# Patient Record
Sex: FEMALE | Race: WHITE | Employment: OTHER | URBAN - METROPOLITAN AREA
[De-identification: names, ages, dates, MRNs, and addresses within clinical notes are randomized per-mention and may not be internally consistent; named-entity substitution may affect disease eponyms.]

---

## 2023-07-25 ENCOUNTER — TELEPHONE (OUTPATIENT)
Dept: ORTHOPEDIC SURGERY | Age: 63
End: 2023-07-25

## 2023-07-25 NOTE — TELEPHONE ENCOUNTER
Patient would like refill of percocet. Please send to:    0 [facility] [address] [facility] [address], [address], [address] [place] [id number]   Phone:  [phone]  Fax:  [phone]    Attached Sharri Amaya PA-C as Dr Kevan Soto is out of office.

## 2023-08-02 ENCOUNTER — TELEPHONE (OUTPATIENT)
Dept: ORTHOPEDIC SURGERY | Age: 63
End: 2023-08-02

## 2023-08-02 NOTE — TELEPHONE ENCOUNTER
REFUGIO    Patient called concerned about drainage from surgical incision again. Patient denies feverish, painful incision. Patient describes scant amount of clear pinkish drainage from bottom of incision. Patient denies fever, chills, body aches, chest pain and shortness of breath. Patient has appointment with Pastor Nara CHOWDHURY on 8/4/2023. Patient is comfortable waiting to see PA in a few days for this matter. However, patient was educated what to watch for with incision and body feelings and when to call the office or Ortho Navigator, patient verbalized understanding of education.

## 2023-08-30 PROBLEM — Z79.2 RECEIVING INTRAVENOUS ANTIBIOTIC TREATMENT AS OUTPATIENT: Status: ACTIVE | Noted: 2023-08-30

## 2023-08-30 PROBLEM — A49.01 METHICILLIN SUSCEPTIBLE STAPHYLOCOCCUS AUREUS INFECTION: Status: ACTIVE | Noted: 2023-08-30

## 2023-09-18 ENCOUNTER — HOSPITAL ENCOUNTER (OUTPATIENT)
Age: 63
Setting detail: SPECIMEN
Discharge: HOME | End: 2023-09-18
Payer: COMMERCIAL

## 2023-09-18 ENCOUNTER — TELEPHONE (OUTPATIENT)
Dept: INFECTIOUS DISEASES | Age: 63
End: 2023-09-18

## 2023-09-18 LAB
ALBUMIN SERPL-MCNC: 3.7 GM/DL (ref 3.4–5)
ALP BLD-CCNC: 181 IU/L (ref 40–129)
ALT SERPL-CCNC: 6 U/L (ref 10–40)
ANION GAP SERPL CALCULATED.3IONS-SCNC: 11 MMOL/L (ref 4–16)
AST SERPL-CCNC: 20 IU/L (ref 15–37)
BASOPHILS ABSOLUTE: 0 K/CU MM
BASOPHILS RELATIVE PERCENT: 0.5 % (ref 0–1)
BILIRUB SERPL-MCNC: 0.2 MG/DL (ref 0–1)
BUN SERPL-MCNC: 11 MG/DL (ref 6–23)
CALCIUM SERPL-MCNC: 9.2 MG/DL (ref 8.3–10.6)
CHLORIDE BLD-SCNC: 100 MMOL/L (ref 99–110)
CO2: 26 MMOL/L (ref 21–32)
CREAT SERPL-MCNC: 0.6 MG/DL (ref 0.6–1.1)
CRP SERPL HS-MCNC: 32.2 MG/L
DIFFERENTIAL TYPE: ABNORMAL
EOSINOPHILS ABSOLUTE: 0.3 K/CU MM
EOSINOPHILS RELATIVE PERCENT: 4.2 % (ref 0–3)
ERYTHROCYTE SEDIMENTATION RATE: 57 MM/HR (ref 0–30)
GFR SERPL CREATININE-BSD FRML MDRD: >60 ML/MIN/1.73M2
GLUCOSE P FAST SERPL-MCNC: 93 MG/DL (ref 70–99)
HCT VFR BLD CALC: 36.5 % (ref 37–47)
HEMOGLOBIN: 11.2 GM/DL (ref 12.5–16)
IMMATURE NEUTROPHIL %: 0.2 % (ref 0–0.43)
LYMPHOCYTES ABSOLUTE: 1.4 K/CU MM
LYMPHOCYTES RELATIVE PERCENT: 22.5 % (ref 24–44)
MCH RBC QN AUTO: 24.5 PG (ref 27–31)
MCHC RBC AUTO-ENTMCNC: 30.7 % (ref 32–36)
MCV RBC AUTO: 79.7 FL (ref 78–100)
MONOCYTES ABSOLUTE: 0.6 K/CU MM
MONOCYTES RELATIVE PERCENT: 10.4 % (ref 0–4)
PDW BLD-RTO: 14.4 % (ref 11.7–14.9)
PLATELET # BLD: 335 K/CU MM (ref 140–440)
PMV BLD AUTO: 10.6 FL (ref 7.5–11.1)
POTASSIUM SERPL-SCNC: 4.3 MMOL/L (ref 3.5–5.1)
RBC # BLD: 4.58 M/CU MM (ref 4.2–5.4)
SEGMENTED NEUTROPHILS ABSOLUTE COUNT: 3.9 K/CU MM
SEGMENTED NEUTROPHILS RELATIVE PERCENT: 62.2 % (ref 36–66)
SODIUM BLD-SCNC: 137 MMOL/L (ref 135–145)
TOTAL IMMATURE NEUTOROPHIL: 0.01 K/CU MM
TOTAL PROTEIN: 7.3 GM/DL (ref 6.4–8.2)
WBC # BLD: 6.2 K/CU MM (ref 4–10.5)

## 2023-09-18 PROCEDURE — 86140 C-REACTIVE PROTEIN: CPT

## 2023-09-18 PROCEDURE — 80053 COMPREHEN METABOLIC PANEL: CPT

## 2023-09-18 PROCEDURE — 85025 COMPLETE CBC W/AUTO DIFF WBC: CPT

## 2023-09-18 PROCEDURE — 85652 RBC SED RATE AUTOMATED: CPT

## 2023-09-18 NOTE — TELEPHONE ENCOUNTER
Shivam Morales, from [facility] called and states pt was done on 9-17-23  with ABX. Can PICC be removed? Please advise.

## 2023-09-22 ENCOUNTER — TELEPHONE (OUTPATIENT)
Dept: INFECTIOUS DISEASES | Age: 63
End: 2023-09-22

## 2023-09-25 ENCOUNTER — TELEPHONE (OUTPATIENT)
Dept: INFECTIOUS DISEASES | Age: 63
End: 2023-09-25

## 2023-09-25 NOTE — TELEPHONE ENCOUNTER
This message was posted from [place]:  I started the Bactrim on Monday Sept 18 as instructed. Tuesday I woke up feeling lightheaded, weak and no energy. I laid down a lot of the day. Wednesday I felt the same, but worse. I slept most of the day and night. I also had no appetite. Thursday again the same symptoms. I again slept all day an night. I did not take the Bactrim at all on Thursday. Friday I wasn't as tired, but still very weak, lightheaded and no appetite. By 5:30pm I had a red rash on my arms. That rash did not itch. Then I started itching on various parts of my body. I have taken Benadryl and the itching and rash will stop for about an hour, then starts all over again. This has been nonstop since Friday. I don't know what to do. Please advise. normal...

## 2023-09-26 ENCOUNTER — OFFICE VISIT (OUTPATIENT)
Dept: ORTHOPEDIC SURGERY | Age: 63
End: 2023-09-26

## 2023-09-26 VITALS
RESPIRATION RATE: 16 BRPM | WEIGHT: 276 LBS | HEIGHT: 69 IN | TEMPERATURE: 97.2 F | OXYGEN SATURATION: 99 % | BODY MASS INDEX: 40.88 KG/M2 | HEART RATE: 78 BPM

## 2023-09-26 DIAGNOSIS — Z96.651 S/P TOTAL KNEE REPLACEMENT, RIGHT: Primary | ICD-10-CM

## 2023-09-26 PROCEDURE — 99024 POSTOP FOLLOW-UP VISIT: CPT

## 2023-09-26 ASSESSMENT — ENCOUNTER SYMPTOMS
SHORTNESS OF BREATH: 0
RHINORRHEA: 0
FACIAL SWELLING: 0
NAUSEA: 0
COUGH: 0
BACK PAIN: 0

## 2023-09-26 NOTE — PATIENT INSTRUCTIONS
Work on your gait and strengthening of the lower body  Continue to weight bear as tolerated  Continue range of motion  Ice and elevate as needed  Tylenol or Motrin for pain  Follow up in 4 weeks with Dr. Matilde Carrillo to discuss Left TKA     We are committed to providing you the best care possible. If you receive a survey after visiting one of our offices, please take time to share your experience concerning your physician office visit. These surveys are confidential and no health information about you is shared. We are eager to improve for you and we are counting on your feedback to help make that happen.

## 2023-09-27 ENCOUNTER — OFFICE VISIT (OUTPATIENT)
Dept: INFECTIOUS DISEASES | Age: 63
End: 2023-09-27
Payer: COMMERCIAL

## 2023-09-27 VITALS
SYSTOLIC BLOOD PRESSURE: 112 MMHG | OXYGEN SATURATION: 96 % | DIASTOLIC BLOOD PRESSURE: 65 MMHG | HEART RATE: 111 BPM | WEIGHT: 272 LBS | BODY MASS INDEX: 40.17 KG/M2

## 2023-09-27 DIAGNOSIS — T84.53XD INFECTION OF TOTAL RIGHT KNEE REPLACEMENT, SUBSEQUENT ENCOUNTER: Primary | ICD-10-CM

## 2023-09-27 DIAGNOSIS — T50.905A DRUG-INDUCED PRURITUS: ICD-10-CM

## 2023-09-27 DIAGNOSIS — L29.8 DRUG-INDUCED PRURITUS: ICD-10-CM

## 2023-09-27 PROBLEM — L29.89 DRUG-INDUCED PRURITUS: Status: ACTIVE | Noted: 2023-09-27

## 2023-09-27 PROCEDURE — 99214 OFFICE O/P EST MOD 30 MIN: CPT | Performed by: INTERNAL MEDICINE

## 2023-09-27 RX ORDER — DOXYCYCLINE HYCLATE 100 MG
100 TABLET ORAL 2 TIMES DAILY
Qty: 60 TABLET | Refills: 1 | Status: SHIPPED | OUTPATIENT
Start: 2023-09-27 | End: 2023-11-26

## 2023-09-27 RX ORDER — CHOLECALCIFEROL (VITAMIN D3) 125 MCG
500 CAPSULE ORAL DAILY
COMMUNITY

## 2023-09-27 RX ORDER — PREDNISONE 20 MG/1
30 TABLET ORAL DAILY
Qty: 11 TABLET | Refills: 0 | Status: SHIPPED | OUTPATIENT
Start: 2023-09-27 | End: 2023-10-04

## 2023-09-27 RX ORDER — ACETAMINOPHEN 160 MG
TABLET,DISINTEGRATING ORAL
COMMUNITY

## 2023-09-27 NOTE — PROGRESS NOTES
reactive, extra ocular muscles intact, oropharynx clear, mucus membranes moist, tympanic membranes clear bilaterally, no cervical lymphadenopathy noted, and neck supple  Neck: supple, no significant adenopathy  Chest: clear to auscultation, no wheezes, rales or rhonchi, symmetric air entry  Heart: regular rate and rhythm, no murmurs  ABD: abdomen is soft without significant tenderness, masses, organomegaly or guarding.   EXT:peripheral pulses normal, no pedal edema, no clubbing or cyanosis  NEURO: alert, oriented, normal speech, no focal findings or movement disorder noted  Skin: well hydrated, no lesions, surgical site examined  Wounds: none  Labs:   WBC   Date Value Ref Range Status   09/18/2023 6.2 4.0 - 10.5 K/CU MM Final   09/11/2023 8.1 4.0 - 10.5 K/CU MM Final   09/05/2023 6.6 4.0 - 10.5 K/CU MM Final     Creatinine   Date Value Ref Range Status   09/18/2023 0.6 0.6 - 1.1 MG/DL Final   09/11/2023 0.6 0.6 - 1.1 MG/DL Final   09/05/2023 0.7 0.6 - 1.1 MG/DL Final       Cultures:  Culture   Date Value Ref Range Status   08/07/2023 NO GROWTH AT 5 DAYS  Final   08/07/2023 NO GROWTH AT 5 DAYS  Final   08/04/2023 Final Report  Final   08/04/2023 STAPHYLOCOCCUS AUREUS Light growth PBP2= Negative (A)  Final   08/04/2023 Final Report  Final   08/04/2023 (A)  Final    STAPHYLOCOCCUS AUREUS Light growth No further workup Refer to culture [id number] ( 8/4/23) for sensitivity results       Imaging Studies:

## 2023-09-28 NOTE — ASSESSMENT & PLAN NOTE
Bactrim induced.  Continues to itch despite diphenhydramine use. will use short course of corticosteroids

## 2023-09-29 LAB
A/G RATIO: 1.1
ALBUMIN SERPL-MCNC: 3.9 G/DL
ALP BLD-CCNC: 362 U/L
ALT SERPL-CCNC: 25 U/L
AST SERPL-CCNC: 26 U/L
BILIRUB SERPL-MCNC: 0.6 MG/DL (ref 0.1–1.4)
BILIRUBIN DIRECT: 0.3 MG/DL
BILIRUBIN, INDIRECT: 0.3
BUN BLDV-MCNC: 21 MG/DL
C-REACTIVE PROTEIN: 0.99
CALCIUM SERPL-MCNC: 9.9 MG/DL
CHLORIDE BLD-SCNC: 101 MMOL/L
CO2: 30 MMOL/L
CREAT SERPL-MCNC: 0.7 MG/DL
EGFR: 98
GLOBULIN: 3.7
GLUCOSE BLD-MCNC: 109 MG/DL
POTASSIUM SERPL-SCNC: 5.1 MMOL/L
PROTEIN TOTAL: 7.6 G/DL
SEDIMENTATION RATE, ERYTHROCYTE: 52
SODIUM BLD-SCNC: 140 MMOL/L

## 2023-09-30 LAB
BASOPHILS ABSOLUTE: ABNORMAL
BASOPHILS RELATIVE PERCENT: ABNORMAL
EOSINOPHILS ABSOLUTE: ABNORMAL
EOSINOPHILS RELATIVE PERCENT: ABNORMAL
HCT VFR BLD CALC: 36.7 % (ref 36–46)
HEMOGLOBIN: 11.7 G/DL (ref 12–16)
LYMPHOCYTES ABSOLUTE: ABNORMAL
LYMPHOCYTES RELATIVE PERCENT: ABNORMAL
MCH RBC QN AUTO: 24.4 PG
MCHC RBC AUTO-ENTMCNC: 31.9 G/DL
MCV RBC AUTO: 76.6 FL
MONOCYTES ABSOLUTE: ABNORMAL
MONOCYTES RELATIVE PERCENT: ABNORMAL
NEUTROPHILS ABSOLUTE: ABNORMAL
NEUTROPHILS RELATIVE PERCENT: ABNORMAL
PLATELET # BLD: ABNORMAL 10*3/UL
PMV BLD AUTO: ABNORMAL FL
RBC # BLD: 4.79 10^6/ΜL
WBC # BLD: 9.9 10^3/ML

## 2023-10-02 DIAGNOSIS — T84.53XD INFECTION OF TOTAL RIGHT KNEE REPLACEMENT, SUBSEQUENT ENCOUNTER: ICD-10-CM

## 2023-10-04 ENCOUNTER — OFFICE VISIT (OUTPATIENT)
Dept: INFECTIOUS DISEASES | Age: 63
End: 2023-10-04
Payer: COMMERCIAL

## 2023-10-04 VITALS
DIASTOLIC BLOOD PRESSURE: 59 MMHG | SYSTOLIC BLOOD PRESSURE: 137 MMHG | HEART RATE: 97 BPM | BODY MASS INDEX: 39.93 KG/M2 | HEIGHT: 69 IN | WEIGHT: 269.62 LBS | OXYGEN SATURATION: 96 %

## 2023-10-04 DIAGNOSIS — T50.905A DRUG-INDUCED PRURITUS: Primary | ICD-10-CM

## 2023-10-04 DIAGNOSIS — T84.53XD INFECTION OF TOTAL RIGHT KNEE REPLACEMENT, SUBSEQUENT ENCOUNTER: ICD-10-CM

## 2023-10-04 DIAGNOSIS — L29.8 DRUG-INDUCED PRURITUS: Primary | ICD-10-CM

## 2023-10-04 PROCEDURE — 99214 OFFICE O/P EST MOD 30 MIN: CPT | Performed by: INTERNAL MEDICINE

## 2023-10-04 RX ORDER — PREDNISONE 5 MG/1
TABLET ORAL
Qty: 102 TABLET | Refills: 0 | Status: SHIPPED | OUTPATIENT
Start: 2023-10-04

## 2023-10-04 RX ORDER — CEFADROXIL 500 MG/1
500 CAPSULE ORAL 2 TIMES DAILY
Qty: 28 CAPSULE | Refills: 0 | Status: SHIPPED | OUTPATIENT
Start: 2023-10-04 | End: 2023-10-18

## 2023-10-04 NOTE — ASSESSMENT & PLAN NOTE
CRP on a downward trend. Stop doxycycline for a week. Prescribe cefadroxil for 14 days, to see if she tolerates it.

## 2023-10-04 NOTE — PROGRESS NOTES
10/4/2023         Referring Physician: No ref. provider found  Primary Care Physician: Darrel Fuentes MD    Impression/Plan:   Diagnosis Orders   1. Drug-induced pruritus  predniSONE (DELTASONE) 5 MG tablet      2. Infection of total right knee replacement, subsequent encounter  cefadroxil (DURICEF) 500 MG capsule          Discussion:  Infection of total right knee replacement (HCC)  CRP on a downward trend. Stop doxycycline for a week. Prescribe cefadroxil for 14 days, to see if she tolerates it. Drug-induced pruritus  Itching persists. Possible idiosyncratic reaction to doxycycline. Especially since only her arms and neck itch. Antibiotic holiday for one week. Increase dose of corticosteroids to 60 mg daily, then taper. Return in about 1 week (around 10/11/2023). 35 minutes was spent in the encounter; more than 50% of the face-to-face time was spent with the patient providing counseling and coordination of care. History: Henny Live is a 58 y.o.  female presenting today for follow-up. She was seen in the hospital during 8/4/2023 admission for pain, swelling, and redness of her right knee joint status post right total knee arthroplasty on 7/10/2023. 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 I&D and poly exchange was done on 8/4/2023. Right knee culture and blood cultures were positive for MSSA. She was discharged on 8/10/2023 to complete a 6-week course of intravenous cefazolin and oral rifampicin with a tentative end date of 9/17/2023. And the plan to transition to oral antibiotics for at least 6 months. She is here with her : Kalyn Silvestre. Denies fatigue, fever, chills, night sweats and weight loss. Right knee incision has healed. 9/27/2023: Last visit Bactrim was prescribed. However, she developed itching to it. She has stopped the Bactrim. A day after starting Bactrim she felt light-headed and weak. Itching started on the 5th day of Bactrim.  She stopped Bactrim

## 2023-10-04 NOTE — ASSESSMENT & PLAN NOTE
Itching persists. Possible idiosyncratic reaction to doxycycline. Especially since only her arms and neck itch. Antibiotic holiday for one week. Increase dose of corticosteroids to 60 mg daily, then taper.

## 2023-10-20 LAB
A/G RATIO: 1.2
ALBUMIN SERPL-MCNC: 3.6 G/DL
ALP BLD-CCNC: 142 U/L
ALT SERPL-CCNC: 13 U/L
AST SERPL-CCNC: 15 U/L
BASOPHILS ABSOLUTE: ABNORMAL
BASOPHILS RELATIVE PERCENT: ABNORMAL
BILIRUB SERPL-MCNC: 0.2 MG/DL (ref 0.1–1.4)
BILIRUBIN DIRECT: 0.2 MG/DL
BILIRUBIN, INDIRECT: NORMAL
C-REACTIVE PROTEIN: 1.44
EOSINOPHILS ABSOLUTE: ABNORMAL
EOSINOPHILS RELATIVE PERCENT: ABNORMAL
GLOBULIN: 3.1
HCT VFR BLD CALC: 40.2 % (ref 36–46)
HEMOGLOBIN: 12.4 G/DL (ref 12–16)
LYMPHOCYTES ABSOLUTE: ABNORMAL
LYMPHOCYTES RELATIVE PERCENT: ABNORMAL
MCH RBC QN AUTO: 23.7 PG
MCHC RBC AUTO-ENTMCNC: 30.8 G/DL
MCV RBC AUTO: 76.9 FL
MONOCYTES ABSOLUTE: ABNORMAL
MONOCYTES RELATIVE PERCENT: ABNORMAL
NEUTROPHILS ABSOLUTE: ABNORMAL
NEUTROPHILS RELATIVE PERCENT: ABNORMAL
PLATELET # BLD: 347 K/ΜL
PMV BLD AUTO: 10.4 FL
PROTEIN TOTAL: 6.7 G/DL
RBC # BLD: 5.23 10^6/ΜL
SEDIMENTATION RATE, ERYTHROCYTE: 29
WBC # BLD: 11.7 10^3/ML

## 2023-10-24 ENCOUNTER — OFFICE VISIT (OUTPATIENT)
Dept: ORTHOPEDIC SURGERY | Age: 63
End: 2023-10-24

## 2023-10-24 VITALS
RESPIRATION RATE: 15 BRPM | WEIGHT: 265 LBS | HEART RATE: 75 BPM | OXYGEN SATURATION: 98 % | BODY MASS INDEX: 39.25 KG/M2 | HEIGHT: 69 IN

## 2023-10-24 DIAGNOSIS — M17.12 PRIMARY OSTEOARTHRITIS OF LEFT KNEE: ICD-10-CM

## 2023-10-24 DIAGNOSIS — Z96.651 S/P TOTAL KNEE REPLACEMENT, RIGHT: Primary | ICD-10-CM

## 2023-10-24 RX ORDER — SERTRALINE HYDROCHLORIDE 100 MG/1
100 TABLET, FILM COATED ORAL 2 TIMES DAILY
COMMUNITY
Start: 2023-10-04

## 2023-10-24 NOTE — PATIENT INSTRUCTIONS
We will schedule surgery at soonest convenience.  If you have any questions regarding your surgery please call our office and ask to speak with Gerda Mishra [phone]

## 2023-10-24 NOTE — PROGRESS NOTES
Patient returns to the office with left knee pain. Pt stated that she is seeing the same symptoms with the left knee that she did the right knee. Pt stated that she feels intermittent pain in this knee and instability. Pt stated her right knee did really well with the replacement and would benefit from doing the left.

## 2023-10-25 DIAGNOSIS — T84.53XD INFECTION OF TOTAL RIGHT KNEE REPLACEMENT, SUBSEQUENT ENCOUNTER: ICD-10-CM

## 2023-10-25 RX ORDER — CEFADROXIL 500 MG/1
500 CAPSULE ORAL 2 TIMES DAILY
Qty: 28 CAPSULE | Refills: 0 | OUTPATIENT
Start: 2023-10-25 | End: 2023-11-08

## 2023-10-25 NOTE — TELEPHONE ENCOUNTER
Pt needs a refill on her cefadroxil 500 mgs. Graft Cartilage Fenestration Text: The cartilage was fenestrated with a 2mm punch biopsy to help facilitate graft survival and healing.

## 2023-10-26 DIAGNOSIS — G89.29 CHRONIC PAIN OF LEFT KNEE: ICD-10-CM

## 2023-10-26 DIAGNOSIS — M25.562 CHRONIC PAIN OF LEFT KNEE: ICD-10-CM

## 2023-10-26 DIAGNOSIS — M17.12 PRIMARY OSTEOARTHRITIS OF LEFT KNEE: Primary | ICD-10-CM

## 2023-10-29 ASSESSMENT — ENCOUNTER SYMPTOMS
CHEST TIGHTNESS: 0
EYE PAIN: 0
VOMITING: 0
WHEEZING: 0
EYE REDNESS: 0
SHORTNESS OF BREATH: 0
COLOR CHANGE: 0

## 2023-10-31 ENCOUNTER — TELEPHONE (OUTPATIENT)
Dept: ORTHOPEDIC SURGERY | Age: 63
End: 2023-10-31

## 2023-10-31 NOTE — TELEPHONE ENCOUNTER
Scheduled patient for:    Left Total Knee Arthroplasty  CPT: 41777  ICD 10: M17.12; M25.562  Surgery Date: 12/13/2023  Surgeon: Sherif Finders: [facility]  Product: Yue Juan  Anesthesia: General/Nerve Block    Physical Therapy: [facility]-order created  CT of Lt Knee for robot Protocol-order created    Clearances obtained from:  PCP: Elodia Ott  Cardiac: Ahmed  --from previous surgery in June 2023    Ins[place]  Prior auth started on 10/31/2023 via [address] uploaded. Pending clinical review.   Ref#: [id number]

## 2023-11-02 ENCOUNTER — OFFICE VISIT (OUTPATIENT)
Dept: INFECTIOUS DISEASES | Age: 63
End: 2023-11-02
Payer: COMMERCIAL

## 2023-11-02 VITALS
SYSTOLIC BLOOD PRESSURE: 155 MMHG | DIASTOLIC BLOOD PRESSURE: 61 MMHG | OXYGEN SATURATION: 95 % | HEIGHT: 69 IN | HEART RATE: 76 BPM | WEIGHT: 279.54 LBS | BODY MASS INDEX: 41.4 KG/M2

## 2023-11-02 DIAGNOSIS — T50.905A DRUG-INDUCED PRURITUS: Primary | ICD-10-CM

## 2023-11-02 DIAGNOSIS — L29.8 DRUG-INDUCED PRURITUS: Primary | ICD-10-CM

## 2023-11-02 DIAGNOSIS — T84.53XD INFECTION OF TOTAL RIGHT KNEE REPLACEMENT, SUBSEQUENT ENCOUNTER: ICD-10-CM

## 2023-11-02 PROCEDURE — 99214 OFFICE O/P EST MOD 30 MIN: CPT | Performed by: INTERNAL MEDICINE

## 2023-11-02 RX ORDER — CEFADROXIL 500 MG/1
500 CAPSULE ORAL 2 TIMES DAILY
COMMUNITY
Start: 2023-10-25 | End: 2023-11-02 | Stop reason: SDUPTHER

## 2023-11-02 RX ORDER — CEFADROXIL 500 MG/1
500 CAPSULE ORAL 2 TIMES DAILY
Qty: 60 CAPSULE | Refills: 2 | Status: SHIPPED | OUTPATIENT
Start: 2023-11-02 | End: 2024-01-31

## 2023-11-02 NOTE — PROGRESS NOTES
on file   Housing Stability: Not on file       Family History   Problem Relation Age of Onset    Breast Cancer Neg Hx     Ovarian Cancer Neg Hx        Vital Signs:  Vitals:    11/02/23 1143   BP: (!) 155/61   Site: Left Upper Arm   Position: Sitting   Cuff Size: Large Adult   Pulse: 76   SpO2: 95%   Weight: 126.8 kg (279 lb 8.7 oz)   Height: 1.753 m (5' 9\")            Wt Readings from Last 3 Encounters:   11/02/23 126.8 kg (279 lb 8.7 oz)   10/24/23 120.2 kg (265 lb)   10/04/23 122.3 kg (269 lb 10 oz)        Physical Exam:   Gen: alert and NAD  HEENT: sclera clear, pupils equal and reactive, extra ocular muscles intact, oropharynx clear, mucus membranes moist, tympanic membranes clear bilaterally, no cervical lymphadenopathy noted, and neck supple  Neck: supple, no significant adenopathy  Chest: clear to auscultation, no wheezes, rales or rhonchi, symmetric air entry  Heart: regular rate and rhythm, no murmurs  ABD: abdomen is soft without significant tenderness, masses, organomegaly or guarding.   EXT:peripheral pulses normal, no pedal edema, no clubbing or cyanosis  NEURO: alert, oriented, normal speech, no focal findings or movement disorder noted  Skin: well hydrated, no lesions, surgical site examined  Wounds: none  Labs:   WBC   Date Value Ref Range Status   10/20/2023 11.7 (H) 10^3/mL Final   09/30/2023 9.9 10^3/mL Final   09/18/2023 6.2 4.0 - 10.5 K/CU MM Final     Creatinine   Date Value Ref Range Status   10/17/2023 1.0  Final   09/29/2023 0.7  Final   09/18/2023 0.6 0.6 - 1.1 MG/DL Final       Cultures:  Culture   Date Value Ref Range Status   08/07/2023 NO GROWTH AT 5 DAYS  Final   08/07/2023 NO GROWTH AT 5 DAYS  Final   08/04/2023 Final Report  Final   08/04/2023 STAPHYLOCOCCUS AUREUS Light growth PBP2= Negative (A)  Final   08/04/2023 Final Report  Final   08/04/2023 (A)  Final    STAPHYLOCOCCUS AUREUS Light growth No further workup Refer to culture [id number] ( 8/4/23) for sensitivity results

## 2023-11-02 NOTE — ASSESSMENT & PLAN NOTE
Labs 10/20/2023  Reviewed. CRP 1.44 mg/dL WBC 11.7. ESR: 20. On dwt. Clinically improved. Continue cefadroxil 90 day supply ordered. 6 month course of abx will be complete on 2/3/2024. She plans to get a left knee replacement this year.

## 2023-11-20 ENCOUNTER — HOSPITAL ENCOUNTER (OUTPATIENT)
Dept: CT IMAGING | Age: 63
Discharge: HOME | End: 2023-11-20
Attending: ORTHOPAEDIC SURGERY
Payer: COMMERCIAL

## 2023-11-20 DIAGNOSIS — M17.12 PRIMARY OSTEOARTHRITIS OF LEFT KNEE: ICD-10-CM

## 2023-11-20 DIAGNOSIS — M25.562 CHRONIC PAIN OF LEFT KNEE: ICD-10-CM

## 2023-11-20 DIAGNOSIS — G89.29 CHRONIC PAIN OF LEFT KNEE: ICD-10-CM

## 2023-11-20 PROCEDURE — 73700 CT LOWER EXTREMITY W/O DYE: CPT

## 2023-11-28 RX ORDER — ROPINIROLE 0.25 MG/1
0.75 TABLET, FILM COATED ORAL NIGHTLY
COMMUNITY

## 2023-12-01 ENCOUNTER — HOSPITAL ENCOUNTER (OUTPATIENT)
Age: 63
Discharge: HOME | End: 2023-12-01
Payer: COMMERCIAL

## 2023-12-01 DIAGNOSIS — Z01.818 PRE-OP TESTING: ICD-10-CM

## 2023-12-01 LAB
ALBUMIN SERPL-MCNC: 4.1 GM/DL (ref 3.4–5)
ALBUMIN SERPL-MCNC: 4.2 GM/DL (ref 3.4–5)
ALP BLD-CCNC: 156 IU/L (ref 40–128)
ALP BLD-CCNC: 159 IU/L (ref 40–129)
ALT SERPL-CCNC: 11 U/L (ref 10–40)
ALT SERPL-CCNC: 11 U/L (ref 10–40)
ANION GAP SERPL CALCULATED.3IONS-SCNC: 10 MMOL/L (ref 4–16)
ANION GAP SERPL CALCULATED.3IONS-SCNC: 11 MMOL/L (ref 4–16)
AST SERPL-CCNC: 17 IU/L (ref 15–37)
AST SERPL-CCNC: 18 IU/L (ref 15–37)
BACTERIA: NEGATIVE /HPF
BASOPHILS ABSOLUTE: 0.1 K/CU MM
BASOPHILS RELATIVE PERCENT: 0.8 % (ref 0–1)
BILIRUB SERPL-MCNC: 0.2 MG/DL (ref 0–1)
BILIRUB SERPL-MCNC: 0.2 MG/DL (ref 0–1)
BILIRUBIN DIRECT: 0.2 MG/DL (ref 0–0.3)
BILIRUBIN URINE: NEGATIVE MG/DL
BILIRUBIN, INDIRECT: 0 MG/DL (ref 0–0.7)
BLOOD, URINE: NEGATIVE
BUN SERPL-MCNC: 22 MG/DL (ref 6–23)
BUN SERPL-MCNC: 22 MG/DL (ref 6–23)
CALCIUM SERPL-MCNC: 9.5 MG/DL (ref 8.3–10.6)
CALCIUM SERPL-MCNC: 9.6 MG/DL (ref 8.3–10.6)
CHLORIDE BLD-SCNC: 101 MMOL/L (ref 99–110)
CHLORIDE BLD-SCNC: 103 MMOL/L (ref 99–110)
CLARITY: CLEAR
CO2: 26 MMOL/L (ref 21–32)
CO2: 27 MMOL/L (ref 21–32)
COLOR: YELLOW
CREAT SERPL-MCNC: 0.7 MG/DL (ref 0.6–1.1)
CREAT SERPL-MCNC: 0.7 MG/DL (ref 0.6–1.1)
CRP SERPL HS-MCNC: 16.8 MG/L
DIFFERENTIAL TYPE: ABNORMAL
EOSINOPHILS ABSOLUTE: 0.3 K/CU MM
EOSINOPHILS RELATIVE PERCENT: 3.2 % (ref 0–3)
ERYTHROCYTE SEDIMENTATION RATE: 79 MM/HR (ref 0–30)
ERYTHROCYTE SEDIMENTATION RATE: 83 MM/HR (ref 0–30)
GFR SERPL CREATININE-BSD FRML MDRD: >60 ML/MIN/1.73M2
GFR SERPL CREATININE-BSD FRML MDRD: >60 ML/MIN/1.73M2
GLUCOSE SERPL-MCNC: 103 MG/DL (ref 70–99)
GLUCOSE SERPL-MCNC: 106 MG/DL (ref 70–99)
GLUCOSE, URINE: NEGATIVE MG/DL
HCT VFR BLD CALC: 39.3 % (ref 37–47)
HCT VFR BLD CALC: 39.7 % (ref 37–47)
HEMOGLOBIN: 12.1 GM/DL (ref 12.5–16)
HEMOGLOBIN: 12.1 GM/DL (ref 12.5–16)
IMMATURE NEUTROPHIL %: 0.2 % (ref 0–0.43)
KETONES, URINE: NEGATIVE MG/DL
LEUKOCYTE ESTERASE, URINE: ABNORMAL
LYMPHOCYTES ABSOLUTE: 2.2 K/CU MM
LYMPHOCYTES RELATIVE PERCENT: 24.4 % (ref 24–44)
MCH RBC QN AUTO: 23.1 PG (ref 27–31)
MCH RBC QN AUTO: 23.2 PG (ref 27–31)
MCHC RBC AUTO-ENTMCNC: 30.5 % (ref 32–36)
MCHC RBC AUTO-ENTMCNC: 30.8 % (ref 32–36)
MCV RBC AUTO: 75.3 FL (ref 78–100)
MCV RBC AUTO: 75.8 FL (ref 78–100)
MONOCYTES ABSOLUTE: 0.6 K/CU MM
MONOCYTES RELATIVE PERCENT: 6.7 % (ref 0–4)
MUCUS: ABNORMAL HPF
NITRITE URINE, QUANTITATIVE: NEGATIVE
NON SQUAM EPI CELLS: 1 /HPF
NUCLEATED RBC %: 0 %
PDW BLD-RTO: 15.1 % (ref 11.7–14.9)
PDW BLD-RTO: 15.2 % (ref 11.7–14.9)
PH, URINE: 5.5 (ref 5–8)
PLATELET # BLD: 374 K/CU MM (ref 140–440)
PLATELET # BLD: 402 K/CU MM (ref 140–440)
PMV BLD AUTO: 10.4 FL (ref 7.5–11.1)
PMV BLD AUTO: 10.4 FL (ref 7.5–11.1)
POTASSIUM SERPL-SCNC: 4.5 MMOL/L (ref 3.5–5.1)
POTASSIUM SERPL-SCNC: 4.6 MMOL/L (ref 3.5–5.1)
PROTEIN UA: NEGATIVE MG/DL
RBC # BLD: 5.22 M/CU MM (ref 4.2–5.4)
RBC # BLD: 5.24 M/CU MM (ref 4.2–5.4)
RBC URINE: 1 /HPF (ref 0–6)
SEGMENTED NEUTROPHILS ABSOLUTE COUNT: 6 K/CU MM
SEGMENTED NEUTROPHILS RELATIVE PERCENT: 64.7 % (ref 36–66)
SODIUM BLD-SCNC: 138 MMOL/L (ref 135–145)
SODIUM BLD-SCNC: 140 MMOL/L (ref 135–145)
SPECIFIC GRAVITY UA: 1.02 (ref 1–1.03)
SQUAMOUS EPITHELIAL: 6 /HPF
TOTAL IMMATURE NEUTOROPHIL: 0.02 K/CU MM
TOTAL NUCLEATED RBC: 0 K/CU MM
TOTAL PROTEIN: 7.2 GM/DL (ref 6.4–8.2)
TOTAL PROTEIN: 7.5 GM/DL (ref 6.4–8.2)
TRICHOMONAS: ABNORMAL /HPF
UROBILINOGEN, URINE: 0.2 MG/DL (ref 0.2–1)
WBC # BLD: 9.1 K/CU MM (ref 4–10.5)
WBC # BLD: 9.2 K/CU MM (ref 4–10.5)
WBC UA: 2 /HPF (ref 0–5)

## 2023-12-01 PROCEDURE — 82248 BILIRUBIN DIRECT: CPT

## 2023-12-01 PROCEDURE — 87086 URINE CULTURE/COLONY COUNT: CPT

## 2023-12-01 PROCEDURE — 85652 RBC SED RATE AUTOMATED: CPT

## 2023-12-01 PROCEDURE — 81001 URINALYSIS AUTO W/SCOPE: CPT

## 2023-12-01 PROCEDURE — 85025 COMPLETE CBC W/AUTO DIFF WBC: CPT

## 2023-12-01 PROCEDURE — 85027 COMPLETE CBC AUTOMATED: CPT

## 2023-12-01 PROCEDURE — 86140 C-REACTIVE PROTEIN: CPT

## 2023-12-01 PROCEDURE — 36415 COLL VENOUS BLD VENIPUNCTURE: CPT

## 2023-12-01 PROCEDURE — 80053 COMPREHEN METABOLIC PANEL: CPT

## 2023-12-02 LAB
CULTURE: NORMAL
Lab: NORMAL
SPECIMEN: NORMAL

## 2023-12-04 ENCOUNTER — TELEPHONE (OUTPATIENT)
Dept: ORTHOPEDIC SURGERY | Age: 63
End: 2023-12-04

## 2023-12-04 DIAGNOSIS — M17.12 PRIMARY OSTEOARTHRITIS OF LEFT KNEE: ICD-10-CM

## 2023-12-04 DIAGNOSIS — Z96.651 S/P TOTAL KNEE REPLACEMENT, RIGHT: Primary | ICD-10-CM

## 2023-12-04 RX ORDER — TRAMADOL HYDROCHLORIDE 50 MG/1
50 TABLET ORAL EVERY 8 HOURS PRN
Qty: 21 TABLET | Refills: 0 | Status: SHIPPED | OUTPATIENT
Start: 2023-12-04 | End: 2023-12-11

## 2023-12-04 NOTE — TELEPHONE ENCOUNTER
Patient called stating she was having increased pain and would like something until Sx, DOS 12/13/2023. Patient states Tylenol is not working at this time.

## 2023-12-07 ENCOUNTER — OFFICE VISIT (OUTPATIENT)
Dept: INFECTIOUS DISEASES | Age: 63
End: 2023-12-07
Payer: COMMERCIAL

## 2023-12-07 VITALS
RESPIRATION RATE: 18 BRPM | HEART RATE: 104 BPM | WEIGHT: 279.6 LBS | BODY MASS INDEX: 41.29 KG/M2 | DIASTOLIC BLOOD PRESSURE: 74 MMHG | SYSTOLIC BLOOD PRESSURE: 161 MMHG

## 2023-12-07 DIAGNOSIS — T84.53XD INFECTION OF TOTAL RIGHT KNEE REPLACEMENT, SUBSEQUENT ENCOUNTER: Primary | ICD-10-CM

## 2023-12-07 PROCEDURE — 99214 OFFICE O/P EST MOD 30 MIN: CPT | Performed by: INTERNAL MEDICINE

## 2023-12-07 NOTE — PROGRESS NOTES
12/9/2023         Referring Physician: No ref. provider found  Primary Care Physician: Michelle Cespedes MD    Impression/Plan:   Diagnosis Orders   1. Infection of total right knee replacement, subsequent encounter  C-Reactive Protein    CBC    Basic Metabolic Panel    Hepatic Function Panel    Sedimentation Rate          Discussion:  Infection of total right knee replacement ([address])  Labs from 12/1/2023 were reviewed. CRP 16.8 sed rate 83. CRP is reduced. Scheduled for surgery on 12/13/2023. Has been on abx for 4.5 months. Aim to treat for one year and likely life-long suppression. Return in about 6 weeks (around 1/18/2024). 33 minutes was spent in the encounter; more than 50% of the face-to-face time was spent with the patient providing counseling and coordination of care. History: Katerina Kahn is a 61 y.o.  female presenting today for follow-up. She was seen in the hospital during 8/4/2023 admission for pain, swelling, and redness of her right knee joint status post right total knee arthroplasty on 7/10/2023. 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 I&D and poly exchange was done on 8/4/2023. Right knee culture and blood cultures were positive for MSSA. She was discharged on 8/10/2023 to complete a 6-week course of intravenous cefazolin and oral rifampicin with a tentative end date of 9/17/2023. And the plan to transition to oral antibiotics for at least 6 months. She is here with her : Toshia Goodrich. Denies fatigue, fever, chills, night sweats and weight loss. Right knee incision has healed. 9/27/2023: Last visit Bactrim was prescribed. However, she developed itching to it. She has stopped the Bactrim. A day after starting Bactrim she felt light-headed and weak. Itching started on the 5th day of Bactrim. She stopped Bactrim one week ago but continues to itch. 10/4/2023: At last visit she was diagnosed with drug-induced rash due to Bactrim. Prednisone was prescribed.

## 2023-12-07 NOTE — ASSESSMENT & PLAN NOTE
Labs from 12/1/2023 were reviewed. CRP 16.8 sed rate 83. CRP is reduced. Scheduled for surgery on 12/13/2023. Has been on abx for 4.5 months. Aim to treat for one year and likely life-long suppression.

## 2023-12-12 ENCOUNTER — ANESTHESIA EVENT (OUTPATIENT)
Dept: OPERATING ROOM | Age: 63
End: 2023-12-12
Payer: COMMERCIAL

## 2023-12-13 ENCOUNTER — ANESTHESIA (OUTPATIENT)
Dept: OPERATING ROOM | Age: 63
End: 2023-12-13
Payer: COMMERCIAL

## 2023-12-13 ENCOUNTER — HOSPITAL ENCOUNTER (OUTPATIENT)
Age: 63
Setting detail: OBSERVATION
Discharge: HOME | End: 2023-12-15
Attending: ORTHOPAEDIC SURGERY | Admitting: ORTHOPAEDIC SURGERY
Payer: COMMERCIAL

## 2023-12-13 ENCOUNTER — APPOINTMENT (OUTPATIENT)
Dept: GENERAL RADIOLOGY | Age: 63
End: 2023-12-13
Attending: ORTHOPAEDIC SURGERY
Payer: COMMERCIAL

## 2023-12-13 DIAGNOSIS — Z96.652 S/P TOTAL KNEE REPLACEMENT, LEFT: ICD-10-CM

## 2023-12-13 DIAGNOSIS — Z01.818 PRE-OP TESTING: Primary | ICD-10-CM

## 2023-12-13 PROCEDURE — 7100000001 HC PACU RECOVERY - ADDTL 15 MIN: Performed by: ORTHOPAEDIC SURGERY

## 2023-12-13 PROCEDURE — 97162 PT EVAL MOD COMPLEX 30 MIN: CPT

## 2023-12-13 PROCEDURE — 6360000002 HC RX W HCPCS: Performed by: ORTHOPAEDIC SURGERY

## 2023-12-13 PROCEDURE — 3600000009 HC SURGERY ROBOT BASE: Performed by: ORTHOPAEDIC SURGERY

## 2023-12-13 PROCEDURE — 97116 GAIT TRAINING THERAPY: CPT

## 2023-12-13 PROCEDURE — 2580000003 HC RX 258: Performed by: ORTHOPAEDIC SURGERY

## 2023-12-13 PROCEDURE — C1713 ANCHOR/SCREW BN/BN,TIS/BN: HCPCS | Performed by: ORTHOPAEDIC SURGERY

## 2023-12-13 PROCEDURE — S2900 ROBOTIC SURGICAL SYSTEM: HCPCS | Performed by: ORTHOPAEDIC SURGERY

## 2023-12-13 PROCEDURE — 64447 NJX AA&/STRD FEMORAL NRV IMG: CPT | Performed by: ANESTHESIOLOGY

## 2023-12-13 PROCEDURE — 6370000000 HC RX 637 (ALT 250 FOR IP): Performed by: ORTHOPAEDIC SURGERY

## 2023-12-13 PROCEDURE — C1776 JOINT DEVICE (IMPLANTABLE): HCPCS | Performed by: ORTHOPAEDIC SURGERY

## 2023-12-13 PROCEDURE — 2720000010 HC SURG SUPPLY STERILE: Performed by: ORTHOPAEDIC SURGERY

## 2023-12-13 PROCEDURE — 2500000003 HC RX 250 WO HCPCS

## 2023-12-13 PROCEDURE — A4217 STERILE WATER/SALINE, 500 ML: HCPCS | Performed by: ORTHOPAEDIC SURGERY

## 2023-12-13 PROCEDURE — 3600000019 HC SURGERY ROBOT ADDTL 15MIN: Performed by: ORTHOPAEDIC SURGERY

## 2023-12-13 PROCEDURE — 7100000000 HC PACU RECOVERY - FIRST 15 MIN: Performed by: ORTHOPAEDIC SURGERY

## 2023-12-13 PROCEDURE — 2500000003 HC RX 250 WO HCPCS: Performed by: ORTHOPAEDIC SURGERY

## 2023-12-13 PROCEDURE — 6360000002 HC RX W HCPCS

## 2023-12-13 PROCEDURE — 6360000002 HC RX W HCPCS: Performed by: ANESTHESIOLOGY

## 2023-12-13 PROCEDURE — G0378 HOSPITAL OBSERVATION PER HR: HCPCS

## 2023-12-13 PROCEDURE — 2709999900 HC NON-CHARGEABLE SUPPLY: Performed by: ORTHOPAEDIC SURGERY

## 2023-12-13 PROCEDURE — 3700000000 HC ANESTHESIA ATTENDED CARE: Performed by: ORTHOPAEDIC SURGERY

## 2023-12-13 PROCEDURE — 2700000000 HC OXYGEN THERAPY PER DAY

## 2023-12-13 PROCEDURE — 3700000001 HC ADD 15 MINUTES (ANESTHESIA): Performed by: ORTHOPAEDIC SURGERY

## 2023-12-13 PROCEDURE — 94761 N-INVAS EAR/PLS OXIMETRY MLT: CPT

## 2023-12-13 PROCEDURE — 73560 X-RAY EXAM OF KNEE 1 OR 2: CPT

## 2023-12-13 RX ORDER — PANTOPRAZOLE SODIUM 40 MG/1
40 TABLET, DELAYED RELEASE ORAL
Status: DISCONTINUED | OUTPATIENT
Start: 2023-12-14 | End: 2023-12-15 | Stop reason: HOSPADM

## 2023-12-13 RX ORDER — SODIUM CHLORIDE, SODIUM LACTATE, POTASSIUM CHLORIDE, CALCIUM CHLORIDE 600; 310; 30; 20 MG/100ML; MG/100ML; MG/100ML; MG/100ML
INJECTION, SOLUTION INTRAVENOUS CONTINUOUS
Status: DISPENSED | OUTPATIENT
Start: 2023-12-13 | End: 2023-12-14

## 2023-12-13 RX ORDER — MEPERIDINE HYDROCHLORIDE 25 MG/ML
12.5 INJECTION INTRAMUSCULAR; INTRAVENOUS; SUBCUTANEOUS EVERY 5 MIN PRN
Status: DISCONTINUED | OUTPATIENT
Start: 2023-12-13 | End: 2023-12-13 | Stop reason: HOSPADM

## 2023-12-13 RX ORDER — SODIUM CHLORIDE 0.9 % (FLUSH) 0.9 %
5-40 SYRINGE (ML) INJECTION PRN
Status: DISCONTINUED | OUTPATIENT
Start: 2023-12-13 | End: 2023-12-13 | Stop reason: HOSPADM

## 2023-12-13 RX ORDER — LANOLIN ALCOHOL/MO/W.PET/CERES
500 CREAM (GRAM) TOPICAL DAILY
Status: DISCONTINUED | OUTPATIENT
Start: 2023-12-14 | End: 2023-12-15 | Stop reason: HOSPADM

## 2023-12-13 RX ORDER — UBIDECARENONE 100 MG
100 CAPSULE ORAL DAILY
Status: DISCONTINUED | OUTPATIENT
Start: 2023-12-14 | End: 2023-12-15 | Stop reason: HOSPADM

## 2023-12-13 RX ORDER — SODIUM CHLORIDE 9 MG/ML
INJECTION, SOLUTION INTRAVENOUS PRN
Status: DISCONTINUED | OUTPATIENT
Start: 2023-12-13 | End: 2023-12-13 | Stop reason: HOSPADM

## 2023-12-13 RX ORDER — HYDRALAZINE HYDROCHLORIDE 20 MG/ML
10 INJECTION INTRAMUSCULAR; INTRAVENOUS
Status: DISCONTINUED | OUTPATIENT
Start: 2023-12-13 | End: 2023-12-13 | Stop reason: HOSPADM

## 2023-12-13 RX ORDER — ESMOLOL HYDROCHLORIDE 10 MG/ML
INJECTION INTRAVENOUS PRN
Status: DISCONTINUED | OUTPATIENT
Start: 2023-12-13 | End: 2023-12-13 | Stop reason: SDUPTHER

## 2023-12-13 RX ORDER — LIDOCAINE HYDROCHLORIDE 20 MG/ML
INJECTION, SOLUTION INTRAVENOUS PRN
Status: DISCONTINUED | OUTPATIENT
Start: 2023-12-13 | End: 2023-12-13 | Stop reason: SDUPTHER

## 2023-12-13 RX ORDER — ALBUTEROL SULFATE 90 UG/1
2 AEROSOL, METERED RESPIRATORY (INHALATION) EVERY 6 HOURS PRN
Status: DISCONTINUED | OUTPATIENT
Start: 2023-12-13 | End: 2023-12-15 | Stop reason: HOSPADM

## 2023-12-13 RX ORDER — DROPERIDOL 2.5 MG/ML
0.62 INJECTION, SOLUTION INTRAMUSCULAR; INTRAVENOUS
Status: DISCONTINUED | OUTPATIENT
Start: 2023-12-13 | End: 2023-12-13 | Stop reason: HOSPADM

## 2023-12-13 RX ORDER — ROPINIROLE 0.25 MG/1
0.75 TABLET, FILM COATED ORAL NIGHTLY
Status: DISCONTINUED | OUTPATIENT
Start: 2023-12-13 | End: 2023-12-14

## 2023-12-13 RX ORDER — SODIUM CHLORIDE 0.9 % (FLUSH) 0.9 %
5-40 SYRINGE (ML) INJECTION PRN
Status: DISCONTINUED | OUTPATIENT
Start: 2023-12-13 | End: 2023-12-15 | Stop reason: HOSPADM

## 2023-12-13 RX ORDER — TRANEXAMIC ACID 10 MG/ML
1000 INJECTION, SOLUTION INTRAVENOUS
Status: COMPLETED | OUTPATIENT
Start: 2023-12-13 | End: 2023-12-13

## 2023-12-13 RX ORDER — ACETAMINOPHEN 500 MG
1000 TABLET ORAL ONCE
Status: COMPLETED | OUTPATIENT
Start: 2023-12-13 | End: 2023-12-13

## 2023-12-13 RX ORDER — MAGNESIUM HYDROXIDE 1200 MG/15ML
LIQUID ORAL CONTINUOUS PRN
Status: COMPLETED | OUTPATIENT
Start: 2023-12-13 | End: 2023-12-13

## 2023-12-13 RX ORDER — SODIUM CHLORIDE 0.9 % (FLUSH) 0.9 %
5-40 SYRINGE (ML) INJECTION EVERY 12 HOURS SCHEDULED
Status: DISCONTINUED | OUTPATIENT
Start: 2023-12-13 | End: 2023-12-13 | Stop reason: HOSPADM

## 2023-12-13 RX ORDER — OMEPRAZOLE 20 MG/1
40 CAPSULE, DELAYED RELEASE ORAL
Status: DISCONTINUED | OUTPATIENT
Start: 2023-12-13 | End: 2023-12-13

## 2023-12-13 RX ORDER — ENOXAPARIN SODIUM 100 MG/ML
40 INJECTION SUBCUTANEOUS DAILY
Status: DISCONTINUED | OUTPATIENT
Start: 2023-12-14 | End: 2023-12-15 | Stop reason: HOSPADM

## 2023-12-13 RX ORDER — FENTANYL CITRATE 50 UG/ML
INJECTION, SOLUTION INTRAMUSCULAR; INTRAVENOUS PRN
Status: DISCONTINUED | OUTPATIENT
Start: 2023-12-13 | End: 2023-12-13 | Stop reason: SDUPTHER

## 2023-12-13 RX ORDER — CEFAZOLIN SODIUM 1 G/3ML
INJECTION, POWDER, FOR SOLUTION INTRAMUSCULAR; INTRAVENOUS PRN
Status: DISCONTINUED | OUTPATIENT
Start: 2023-12-13 | End: 2023-12-13 | Stop reason: SDUPTHER

## 2023-12-13 RX ORDER — SODIUM CHLORIDE 9 MG/ML
INJECTION, SOLUTION INTRAVENOUS PRN
Status: DISCONTINUED | OUTPATIENT
Start: 2023-12-13 | End: 2023-12-15 | Stop reason: HOSPADM

## 2023-12-13 RX ORDER — KETOROLAC TROMETHAMINE 30 MG/ML
INJECTION, SOLUTION INTRAMUSCULAR; INTRAVENOUS
Status: COMPLETED | OUTPATIENT
Start: 2023-12-13 | End: 2023-12-13

## 2023-12-13 RX ORDER — OXYCODONE HYDROCHLORIDE AND ACETAMINOPHEN 5; 325 MG/1; MG/1
1 TABLET ORAL EVERY 6 HOURS
Status: DISCONTINUED | OUTPATIENT
Start: 2023-12-13 | End: 2023-12-15 | Stop reason: HOSPADM

## 2023-12-13 RX ORDER — ONDANSETRON 2 MG/ML
4 INJECTION INTRAMUSCULAR; INTRAVENOUS
Status: DISCONTINUED | OUTPATIENT
Start: 2023-12-13 | End: 2023-12-13 | Stop reason: HOSPADM

## 2023-12-13 RX ORDER — ROPIVACAINE HYDROCHLORIDE 5 MG/ML
INJECTION, SOLUTION EPIDURAL; INFILTRATION; PERINEURAL
Status: COMPLETED
Start: 2023-12-13 | End: 2023-12-13

## 2023-12-13 RX ORDER — DEXAMETHASONE SODIUM PHOSPHATE 4 MG/ML
INJECTION, SOLUTION INTRA-ARTICULAR; INTRALESIONAL; INTRAMUSCULAR; INTRAVENOUS; SOFT TISSUE PRN
Status: DISCONTINUED | OUTPATIENT
Start: 2023-12-13 | End: 2023-12-13 | Stop reason: SDUPTHER

## 2023-12-13 RX ORDER — SODIUM CHLORIDE, SODIUM LACTATE, POTASSIUM CHLORIDE, CALCIUM CHLORIDE 600; 310; 30; 20 MG/100ML; MG/100ML; MG/100ML; MG/100ML
INJECTION, SOLUTION INTRAVENOUS CONTINUOUS
Status: DISCONTINUED | OUTPATIENT
Start: 2023-12-13 | End: 2023-12-13 | Stop reason: HOSPADM

## 2023-12-13 RX ORDER — METOPROLOL TARTRATE 1 MG/ML
INJECTION, SOLUTION INTRAVENOUS PRN
Status: DISCONTINUED | OUTPATIENT
Start: 2023-12-13 | End: 2023-12-13 | Stop reason: SDUPTHER

## 2023-12-13 RX ORDER — ROCURONIUM BROMIDE 10 MG/ML
INJECTION, SOLUTION INTRAVENOUS PRN
Status: DISCONTINUED | OUTPATIENT
Start: 2023-12-13 | End: 2023-12-13 | Stop reason: SDUPTHER

## 2023-12-13 RX ORDER — OXYCODONE HYDROCHLORIDE 5 MG/1
5 TABLET ORAL EVERY 4 HOURS PRN
Status: DISCONTINUED | OUTPATIENT
Start: 2023-12-13 | End: 2023-12-15 | Stop reason: HOSPADM

## 2023-12-13 RX ORDER — ACETAMINOPHEN 325 MG/1
650 TABLET ORAL EVERY 4 HOURS PRN
Status: DISCONTINUED | OUTPATIENT
Start: 2023-12-13 | End: 2023-12-15 | Stop reason: HOSPADM

## 2023-12-13 RX ORDER — LABETALOL HYDROCHLORIDE 5 MG/ML
10 INJECTION, SOLUTION INTRAVENOUS
Status: DISCONTINUED | OUTPATIENT
Start: 2023-12-13 | End: 2023-12-13 | Stop reason: HOSPADM

## 2023-12-13 RX ORDER — PROPOFOL 10 MG/ML
INJECTION, EMULSION INTRAVENOUS PRN
Status: DISCONTINUED | OUTPATIENT
Start: 2023-12-13 | End: 2023-12-13 | Stop reason: SDUPTHER

## 2023-12-13 RX ORDER — BUPIVACAINE HYDROCHLORIDE 5 MG/ML
INJECTION, SOLUTION EPIDURAL; INTRACAUDAL
Status: COMPLETED | OUTPATIENT
Start: 2023-12-13 | End: 2023-12-13

## 2023-12-13 RX ORDER — SODIUM CHLORIDE 0.9 % (FLUSH) 0.9 %
5-40 SYRINGE (ML) INJECTION EVERY 12 HOURS SCHEDULED
Status: DISCONTINUED | OUTPATIENT
Start: 2023-12-13 | End: 2023-12-15 | Stop reason: HOSPADM

## 2023-12-13 RX ORDER — ROPIVACAINE HYDROCHLORIDE 5 MG/ML
INJECTION, SOLUTION EPIDURAL; INFILTRATION; PERINEURAL
Status: DISCONTINUED | OUTPATIENT
Start: 2023-12-13 | End: 2023-12-13 | Stop reason: SDUPTHER

## 2023-12-13 RX ORDER — FENTANYL CITRATE 50 UG/ML
50 INJECTION, SOLUTION INTRAMUSCULAR; INTRAVENOUS EVERY 5 MIN PRN
Status: DISCONTINUED | OUTPATIENT
Start: 2023-12-13 | End: 2023-12-13 | Stop reason: HOSPADM

## 2023-12-13 RX ORDER — VANCOMYCIN HYDROCHLORIDE 1 G/20ML
INJECTION, POWDER, LYOPHILIZED, FOR SOLUTION INTRAVENOUS
Status: COMPLETED | OUTPATIENT
Start: 2023-12-13 | End: 2023-12-13

## 2023-12-13 RX ORDER — ONDANSETRON 2 MG/ML
INJECTION INTRAMUSCULAR; INTRAVENOUS PRN
Status: DISCONTINUED | OUTPATIENT
Start: 2023-12-13 | End: 2023-12-13 | Stop reason: SDUPTHER

## 2023-12-13 RX ORDER — OXYCODONE HYDROCHLORIDE 5 MG/1
5 TABLET ORAL
Status: DISCONTINUED | OUTPATIENT
Start: 2023-12-13 | End: 2023-12-13 | Stop reason: HOSPADM

## 2023-12-13 RX ORDER — LABETALOL HYDROCHLORIDE 5 MG/ML
INJECTION, SOLUTION INTRAVENOUS PRN
Status: DISCONTINUED | OUTPATIENT
Start: 2023-12-13 | End: 2023-12-13 | Stop reason: SDUPTHER

## 2023-12-13 RX ADMIN — METOPROLOL TARTRATE 3 MG: 5 INJECTION INTRAVENOUS at 12:36

## 2023-12-13 RX ADMIN — SODIUM CHLORIDE 3000 MG: 900 INJECTION INTRAVENOUS at 10:55

## 2023-12-13 RX ADMIN — SODIUM CHLORIDE, POTASSIUM CHLORIDE, SODIUM LACTATE AND CALCIUM CHLORIDE: 600; 310; 30; 20 INJECTION, SOLUTION INTRAVENOUS at 17:02

## 2023-12-13 RX ADMIN — FENTANYL CITRATE 100 MCG: 50 INJECTION, SOLUTION INTRAMUSCULAR; INTRAVENOUS at 11:05

## 2023-12-13 RX ADMIN — HYDROMORPHONE HYDROCHLORIDE 0.5 MG: 1 INJECTION, SOLUTION INTRAMUSCULAR; INTRAVENOUS; SUBCUTANEOUS at 12:18

## 2023-12-13 RX ADMIN — PROPOFOL 50 MG: 10 INJECTION, EMULSION INTRAVENOUS at 12:46

## 2023-12-13 RX ADMIN — ESMOLOL HYDROCHLORIDE 50 MG: 10 INJECTION, SOLUTION INTRAVENOUS at 11:37

## 2023-12-13 RX ADMIN — ACETAMINOPHEN 1000 MG: 500 TABLET ORAL at 08:55

## 2023-12-13 RX ADMIN — PROPOFOL 50 MG: 10 INJECTION, EMULSION INTRAVENOUS at 11:36

## 2023-12-13 RX ADMIN — ONDANSETRON 4 MG: 2 INJECTION INTRAMUSCULAR; INTRAVENOUS at 11:28

## 2023-12-13 RX ADMIN — ROCURONIUM BROMIDE 20 MG: 10 INJECTION, SOLUTION INTRAVENOUS at 11:46

## 2023-12-13 RX ADMIN — SODIUM CHLORIDE, POTASSIUM CHLORIDE, SODIUM LACTATE AND CALCIUM CHLORIDE: 600; 310; 30; 20 INJECTION, SOLUTION INTRAVENOUS at 08:51

## 2023-12-13 RX ADMIN — ROCURONIUM BROMIDE 50 MG: 10 INJECTION, SOLUTION INTRAVENOUS at 11:05

## 2023-12-13 RX ADMIN — SODIUM CHLORIDE: 9 INJECTION, SOLUTION INTRAVENOUS at 22:58

## 2023-12-13 RX ADMIN — PROPOFOL 50 MG: 10 INJECTION, EMULSION INTRAVENOUS at 12:26

## 2023-12-13 RX ADMIN — CEFAZOLIN 3 G: 1 INJECTION, POWDER, FOR SOLUTION INTRAMUSCULAR; INTRAVENOUS; PARENTERAL at 11:30

## 2023-12-13 RX ADMIN — TRANEXAMIC ACID 1000 MG: 10 INJECTION, SOLUTION INTRAVENOUS at 11:28

## 2023-12-13 RX ADMIN — ROPIVACAINE HYDROCHLORIDE 20 ML: 5 INJECTION, SOLUTION EPIDURAL; INFILTRATION; PERINEURAL at 13:58

## 2023-12-13 RX ADMIN — SODIUM CHLORIDE, PRESERVATIVE FREE 10 ML: 5 INJECTION INTRAVENOUS at 21:36

## 2023-12-13 RX ADMIN — SUGAMMADEX 200 MG: 100 INJECTION, SOLUTION INTRAVENOUS at 13:46

## 2023-12-13 RX ADMIN — METOPROLOL TARTRATE 2 MG: 5 INJECTION INTRAVENOUS at 12:31

## 2023-12-13 RX ADMIN — PROPOFOL 150 MG: 10 INJECTION, EMULSION INTRAVENOUS at 11:05

## 2023-12-13 RX ADMIN — LIDOCAINE HYDROCHLORIDE 40 MG: 20 INJECTION, SOLUTION INTRAVENOUS at 11:05

## 2023-12-13 RX ADMIN — DEXAMETHASONE SODIUM PHOSPHATE 4 MG: 4 INJECTION, SOLUTION INTRAMUSCULAR; INTRAVENOUS at 11:28

## 2023-12-13 RX ADMIN — HYDROMORPHONE HYDROCHLORIDE 1 MG: 1 INJECTION, SOLUTION INTRAMUSCULAR; INTRAVENOUS; SUBCUTANEOUS at 11:34

## 2023-12-13 RX ADMIN — OXYCODONE HYDROCHLORIDE 5 MG: 5 TABLET ORAL at 17:20

## 2023-12-13 RX ADMIN — LABETALOL HYDROCHLORIDE 10 MG: 5 INJECTION, SOLUTION INTRAVENOUS at 12:40

## 2023-12-13 RX ADMIN — SODIUM CHLORIDE 3000 MG: 900 INJECTION INTRAVENOUS at 22:59

## 2023-12-13 RX ADMIN — HYDROMORPHONE HYDROCHLORIDE 0.5 MG: 1 INJECTION, SOLUTION INTRAMUSCULAR; INTRAVENOUS; SUBCUTANEOUS at 22:48

## 2023-12-13 RX ADMIN — OXYCODONE HYDROCHLORIDE 5 MG: 5 TABLET ORAL at 21:34

## 2023-12-13 ASSESSMENT — PAIN SCALES - GENERAL
PAINLEVEL_OUTOF10: 3
PAINLEVEL_OUTOF10: 10
PAINLEVEL_OUTOF10: 8
PAINLEVEL_OUTOF10: 3
PAINLEVEL_OUTOF10: 8

## 2023-12-13 ASSESSMENT — PAIN DESCRIPTION - LOCATION
LOCATION: KNEE
LOCATION: KNEE

## 2023-12-13 ASSESSMENT — PAIN - FUNCTIONAL ASSESSMENT
PAIN_FUNCTIONAL_ASSESSMENT: 0-10
PAIN_FUNCTIONAL_ASSESSMENT: ACTIVITIES ARE NOT PREVENTED

## 2023-12-13 ASSESSMENT — PAIN DESCRIPTION - DESCRIPTORS
DESCRIPTORS: THROBBING;SORE
DESCRIPTORS: ACHING

## 2023-12-13 ASSESSMENT — PAIN DESCRIPTION - ONSET: ONSET: ON-GOING

## 2023-12-13 ASSESSMENT — PAIN DESCRIPTION - FREQUENCY: FREQUENCY: CONTINUOUS

## 2023-12-13 ASSESSMENT — PAIN DESCRIPTION - ORIENTATION
ORIENTATION: LEFT
ORIENTATION: LEFT

## 2023-12-13 ASSESSMENT — PAIN DESCRIPTION - PAIN TYPE: TYPE: SURGICAL PAIN

## 2023-12-13 NOTE — OP NOTE
drain was placed at the superior lateral aspect of the knee. The medial parapatellar arthrotomy was closed with a #1 strata fix suture. Deep subcutaneous layers were closed with buried 2-0 Vicryl. Skin was then closed with a running 3-0 strata fix subcuticular stitch followed by a Prineo Dermabond dressing. A sterile bandage was applied with 4 x 8's, ABD, sterile web roll, ice therapy pad and an Ace wrap. The patient was awakened and taken to PACU in stable condition. All sponge and needle counts were correct. The patient will be admitted to the hospital for pain control, physical therapy, and medical monitoring. The patient will be on chemical DVT prophylaxis and will be weightbearing as tolerated.      Electronically signed by Remedios Alvarez MD on 12/13/2023 at 2:03 PM

## 2023-12-13 NOTE — H&P
walker instead of a lesser assistive device such as a cane, crutch, or standard walker. The need for this equipment was discussed with the patient and she understands and is in agreement. Plan will be to proceed with a robotic assisted left total knee replacement. She will be on Xarelto postoperatively for DVT prophylaxis. We will discuss antibiotic issues with her infectious disease doctor. We discussed the significant risk that she has with the development of postoperative wound infections having dealt with these in the past at her hip and knee. She feels that this is necessary given the severity of her pain and dysfunction. She understands the possibilities of new onset infection and need for additional surgeries or prolonged antibiotic therapies. History and Physical exam note from the office visit is copied above from epic. I have reviewed the note and examined the patient and find no relevant changes.      I have reviewed with the patient and/or family the risks, benefits, and alternatives to the procedure as well as expected postoperative course    Oziel Schmitt MD

## 2023-12-13 NOTE — ANESTHESIA PROCEDURE NOTES
Peripheral Block    Patient location during procedure: PACU  Reason for block: post-op pain management and at surgeon's request  Start time: 12/13/2023 1:58 PM  End time: 12/13/2023 2:03 PM  Staffing  Performed: resident/CRNA   Resident/CRNA: KSENIA Simmons CRNA  Performed by: KESNIA Simmons CRNA  Authorized by: Unique Berger MD    Preanesthetic Checklist  Completed: patient identified, IV checked, site marked, risks and benefits discussed, surgical/procedural consents, equipment checked, pre-op evaluation, timeout performed, anesthesia consent given, oxygen available, monitors applied/VS acknowledged, fire risk safety assessment completed and verbalized and blood product R/B/A discussed and consented  Peripheral Block   Patient position: supine  Prep: ChloraPrep  Provider prep: mask and sterile gloves  Patient monitoring: cardiac monitor, continuous pulse ox, continuous capnometry, IV access, oxygen and responsive to questions  Block type: Femoral  Adductor canal  Laterality: left  Injection technique: single-shot  Guidance: ultrasound guided    Needle   Needle type: insulated echogenic nerve stimulator needle   Needle localization: ultrasound guidance and anatomical landmarks  Assessment   Injection assessment: negative aspiration for heme, no paresthesia on injection, local visualized surrounding nerve on ultrasound and no intravascular symptoms  Paresthesia pain: none  Slow fractionated injection: yes  Hemodynamics: stable  Outcomes: uncomplicated and patient tolerated procedure well    Medications Administered  ropivacaine (NAROPIN) injection 0.5% - Perineural   20 mL - 12/13/2023 1:58:00 PM

## 2023-12-14 PROCEDURE — 76937 US GUIDE VASCULAR ACCESS: CPT

## 2023-12-14 PROCEDURE — 6360000002 HC RX W HCPCS: Performed by: ORTHOPAEDIC SURGERY

## 2023-12-14 PROCEDURE — 6370000000 HC RX 637 (ALT 250 FOR IP): Performed by: ORTHOPAEDIC SURGERY

## 2023-12-14 PROCEDURE — G0378 HOSPITAL OBSERVATION PER HR: HCPCS

## 2023-12-14 PROCEDURE — 2580000003 HC RX 258: Performed by: ORTHOPAEDIC SURGERY

## 2023-12-14 PROCEDURE — 6360000002 HC RX W HCPCS: Performed by: INTERNAL MEDICINE

## 2023-12-14 PROCEDURE — 97110 THERAPEUTIC EXERCISES: CPT

## 2023-12-14 PROCEDURE — 97116 GAIT TRAINING THERAPY: CPT

## 2023-12-14 PROCEDURE — 94761 N-INVAS EAR/PLS OXIMETRY MLT: CPT

## 2023-12-14 PROCEDURE — 97530 THERAPEUTIC ACTIVITIES: CPT

## 2023-12-14 PROCEDURE — 6370000000 HC RX 637 (ALT 250 FOR IP): Performed by: FAMILY MEDICINE

## 2023-12-14 RX ORDER — KETOROLAC TROMETHAMINE 30 MG/ML
30 INJECTION, SOLUTION INTRAMUSCULAR; INTRAVENOUS ONCE
Status: COMPLETED | OUTPATIENT
Start: 2023-12-14 | End: 2023-12-14

## 2023-12-14 RX ORDER — TRAZODONE HYDROCHLORIDE 50 MG/1
50 TABLET ORAL NIGHTLY
Status: DISCONTINUED | OUTPATIENT
Start: 2023-12-14 | End: 2023-12-15 | Stop reason: HOSPADM

## 2023-12-14 RX ADMIN — ROPINIROLE HYDROCHLORIDE 1.5 MG: 1 TABLET, FILM COATED ORAL at 20:57

## 2023-12-14 RX ADMIN — CYANOCOBALAMIN TAB 1000 MCG 500 MCG: 1000 TAB at 09:36

## 2023-12-14 RX ADMIN — PANTOPRAZOLE SODIUM 40 MG: 40 TABLET, DELAYED RELEASE ORAL at 06:00

## 2023-12-14 RX ADMIN — OXYCODONE HYDROCHLORIDE 5 MG: 5 TABLET ORAL at 13:17

## 2023-12-14 RX ADMIN — SODIUM CHLORIDE, PRESERVATIVE FREE 10 ML: 5 INJECTION INTRAVENOUS at 09:34

## 2023-12-14 RX ADMIN — SODIUM CHLORIDE, PRESERVATIVE FREE 10 ML: 5 INJECTION INTRAVENOUS at 21:00

## 2023-12-14 RX ADMIN — HYDROMORPHONE HYDROCHLORIDE 0.5 MG: 1 INJECTION, SOLUTION INTRAMUSCULAR; INTRAVENOUS; SUBCUTANEOUS at 16:54

## 2023-12-14 RX ADMIN — KETOROLAC TROMETHAMINE 30 MG: 30 INJECTION, SOLUTION INTRAMUSCULAR; INTRAVENOUS at 14:35

## 2023-12-14 RX ADMIN — OXYCODONE AND ACETAMINOPHEN 1 TABLET: 5; 325 TABLET ORAL at 14:43

## 2023-12-14 RX ADMIN — SODIUM CHLORIDE 3000 MG: 900 INJECTION INTRAVENOUS at 14:50

## 2023-12-14 RX ADMIN — OXYCODONE AND ACETAMINOPHEN 1 TABLET: 5; 325 TABLET ORAL at 18:08

## 2023-12-14 RX ADMIN — HYDROMORPHONE HYDROCHLORIDE 0.5 MG: 1 INJECTION, SOLUTION INTRAMUSCULAR; INTRAVENOUS; SUBCUTANEOUS at 07:21

## 2023-12-14 RX ADMIN — SODIUM CHLORIDE 3000 MG: 900 INJECTION INTRAVENOUS at 21:05

## 2023-12-14 RX ADMIN — Medication 100 MG: at 09:35

## 2023-12-14 RX ADMIN — SODIUM CHLORIDE, PRESERVATIVE FREE 10 ML: 5 INJECTION INTRAVENOUS at 21:18

## 2023-12-14 RX ADMIN — OXYCODONE AND ACETAMINOPHEN 1 TABLET: 5; 325 TABLET ORAL at 06:00

## 2023-12-14 RX ADMIN — HYDROMORPHONE HYDROCHLORIDE 0.5 MG: 1 INJECTION, SOLUTION INTRAMUSCULAR; INTRAVENOUS; SUBCUTANEOUS at 10:51

## 2023-12-14 RX ADMIN — OXYCODONE HYDROCHLORIDE 5 MG: 5 TABLET ORAL at 03:29

## 2023-12-14 RX ADMIN — OXYCODONE HYDROCHLORIDE 5 MG: 5 TABLET ORAL at 09:36

## 2023-12-14 RX ADMIN — SERTRALINE HYDROCHLORIDE 100 MG: 50 TABLET ORAL at 20:57

## 2023-12-14 RX ADMIN — ENOXAPARIN SODIUM 40 MG: 100 INJECTION SUBCUTANEOUS at 09:43

## 2023-12-14 RX ADMIN — SODIUM CHLORIDE, POTASSIUM CHLORIDE, SODIUM LACTATE AND CALCIUM CHLORIDE: 600; 310; 30; 20 INJECTION, SOLUTION INTRAVENOUS at 00:36

## 2023-12-14 RX ADMIN — OXYCODONE AND ACETAMINOPHEN 1 TABLET: 5; 325 TABLET ORAL at 23:59

## 2023-12-14 RX ADMIN — SODIUM CHLORIDE 3000 MG: 900 INJECTION INTRAVENOUS at 04:35

## 2023-12-14 RX ADMIN — SERTRALINE HYDROCHLORIDE 100 MG: 50 TABLET ORAL at 09:35

## 2023-12-14 RX ADMIN — SODIUM CHLORIDE, POTASSIUM CHLORIDE, SODIUM LACTATE AND CALCIUM CHLORIDE: 600; 310; 30; 20 INJECTION, SOLUTION INTRAVENOUS at 09:29

## 2023-12-14 RX ADMIN — HYDROMORPHONE HYDROCHLORIDE 0.5 MG: 1 INJECTION, SOLUTION INTRAMUSCULAR; INTRAVENOUS; SUBCUTANEOUS at 20:58

## 2023-12-14 ASSESSMENT — PAIN DESCRIPTION - LOCATION
LOCATION: KNEE
LOCATION: KNEE
LOCATION: LEG
LOCATION: FOOT;LEG
LOCATION: LEG
LOCATION: FOOT;LEG
LOCATION: LEG
LOCATION: KNEE
LOCATION: LEG;FOOT
LOCATION: LEG;FOOT
LOCATION: FOOT;LEG

## 2023-12-14 ASSESSMENT — PAIN SCALES - GENERAL
PAINLEVEL_OUTOF10: 9
PAINLEVEL_OUTOF10: 4
PAINLEVEL_OUTOF10: 4
PAINLEVEL_OUTOF10: 10
PAINLEVEL_OUTOF10: 1
PAINLEVEL_OUTOF10: 7
PAINLEVEL_OUTOF10: 5
PAINLEVEL_OUTOF10: 5
PAINLEVEL_OUTOF10: 10
PAINLEVEL_OUTOF10: 6
PAINLEVEL_OUTOF10: 7
PAINLEVEL_OUTOF10: 9
PAINLEVEL_OUTOF10: 10
PAINLEVEL_OUTOF10: 5

## 2023-12-14 ASSESSMENT — PAIN DESCRIPTION - ORIENTATION
ORIENTATION: LEFT

## 2023-12-14 ASSESSMENT — PAIN SCALES - WONG BAKER: WONGBAKER_NUMERICALRESPONSE: 2

## 2023-12-14 ASSESSMENT — PAIN DESCRIPTION - ONSET
ONSET: ON-GOING
ONSET: ON-GOING

## 2023-12-14 ASSESSMENT — PAIN DESCRIPTION - DESCRIPTORS
DESCRIPTORS: ACHING
DESCRIPTORS: ACHING;THROBBING;SHARP
DESCRIPTORS: THROBBING;SORE
DESCRIPTORS: ACHING
DESCRIPTORS: ACHING;THROBBING
DESCRIPTORS: THROBBING;SORE
DESCRIPTORS: ACHING;SORE
DESCRIPTORS: ACHING;THROBBING
DESCRIPTORS: SORE;ACHING

## 2023-12-14 ASSESSMENT — PAIN DESCRIPTION - FREQUENCY
FREQUENCY: CONTINUOUS
FREQUENCY: CONTINUOUS

## 2023-12-14 ASSESSMENT — PAIN - FUNCTIONAL ASSESSMENT
PAIN_FUNCTIONAL_ASSESSMENT: ACTIVITIES ARE NOT PREVENTED

## 2023-12-14 ASSESSMENT — PAIN DESCRIPTION - PAIN TYPE
TYPE: SURGICAL PAIN
TYPE: SURGICAL PAIN

## 2023-12-14 NOTE — PLAN OF CARE
Problem: Discharge Planning  Goal: Discharge to home or other facility with appropriate resources  Outcome: Progressing     Problem: Pain  Goal: Verbalizes/displays adequate comfort level or baseline comfort level  Outcome: Progressing  Flowsheets (Taken 12/14/2023 0107)  Verbalizes/displays adequate comfort level or baseline comfort level:   Assess pain using appropriate pain scale   Encourage patient to monitor pain and request assistance   Implement non-pharmacological measures as appropriate and evaluate response   Administer analgesics based on type and severity of pain and evaluate response     Problem: ABCDS Injury Assessment  Goal: Absence of physical injury  Outcome: Progressing     Problem: Safety - Adult  Goal: Free from fall injury  Outcome: Progressing     Problem: Skin/Tissue Integrity  Goal: Absence of new skin breakdown  Description: 1. Monitor for areas of redness and/or skin breakdown  2. Assess vascular access sites hourly  3. Every 4-6 hours minimum:  Change oxygen saturation probe site  4. Every 4-6 hours:  If on nasal continuous positive airway pressure, respiratory therapy assess nares and determine need for appliance change or resting period.   Outcome: Progressing

## 2023-12-14 NOTE — CONSULTS
Consult completed. Nexiva 20g 1.75\" peripheral IV initiated into right forearm x 1 attempt using ultrasound guided technique. Brisk blood return, flushes without resistance. Patient tolerated well. Primary nurse notified. Consult the Vascular Access Team for questions, concerns, or change in patient's condition.
SBA  Supine <-> sit:  SBA with cues for sequencing  Transfers: pt completed STS to/from EOB CGA with cues for sequencing  Sitting balance:  good. Standing balance:  fair+. Gait: pt ambulated 13' with RW CGA with decreased alonzo, decreased stance phase on L LE, and decreased step length bilaterally. Cues provided for gait pattern throughout. [facility] 6 Clicks Inpatient Mobility:  AM-PAC Inpatient Mobility Raw Score : 18    Safety: patient left supine in bed (bed alarm broken), call light within reach, RN notified, gait belt used. Assessment:  Pt is a 61 y.o. female admitted to the hospital s/p L TKA on 12/13/2023. Pt is typically independent with all ambulation and transfers without a device. Pt is currently performing bed mobility SBA, transferring CGA, and ambulating 15' with RW CGA. Pt is presenting with decreased endurance, impaired transfers, impaired gait, impaired balance, impaired strength, impaired bed mobility. Pt would benefit from continued acute care PT as well as OP PT upon discharge to continue to address impairments. Complexity: moderate    Prognosis: Good, no significant barriers to participation at this time.      General Plan:  (BID)       Equipment: none, pt owns RW    Goals:  Short Term Goals  Time Frame for Short Term Goals: 1 week  Short Term Goal 1: pt to complete all bed mobility mod I  Short Term Goal 2: pt to complete all STS transfers to/from bed, commode, and chair mod I  Short Term Goal 3: pt to ambulate 100' with LRAD mod I  Short Term Goal 4: Pt to ascend/descend 3 stairs with HR SBA to simulate home set up       Treatment plan:  Bed mobility, transfers, balance, gait, TA, TX    Recommendations for NURSING mobility: amb with gait belt and RW    Time:   Time in: 1523  Time out: 1543  Timed treatment minutes: 10  Total time: 20    Electronically signed by:    Nereida Craft, PT  12/13/2023, 4:42 PM
(EMERGEN-C VITAMIN C LITE PO),   Cholecalciferol (VITAMIN D3) 125 MCG (5000 UT) CAPS,   vitamin B-12 (CYANOCOBALAMIN) 500 MCG tablet, Take 1 tablet by mouth daily  celecoxib (CELEBREX) 100 MG capsule, Take 1 capsule by mouth 2 times daily  coenzyme Q10 100 MG CAPS capsule, Take 1 capsule by mouth daily  omeprazole (PRILOSEC) 40 MG delayed release capsule, Take 1 capsule by mouth daily  albuterol sulfate  (90 Base) MCG/ACT inhaler, Inhale 2 puffs into the lungs every 6 hours as needed    Current Medications  Current Facility-Administered Medications   Medication Dose Route Frequency Provider Last Rate Last Admin    albuterol sulfate HFA (PROVENTIL;VENTOLIN;PROAIR) 108 (90 Base) MCG/ACT inhaler 2 puff  2 puff Inhalation Q6H PRN MD Soctt Vazquez ON 12/14/2023] coenzyme Q10 capsule 100 mg  100 mg Oral Daily Niko Manning MD        rOPINIRole (REQUIP) tablet 0.75 mg  0.75 mg Oral Nightly Niko Manning MD        sertraline (ZOLOFT) tablet 100 mg  100 mg Oral BID MD Scott Vazquez ON 12/14/2023] vitamin B-12 (CYANOCOBALAMIN) tablet 500 mcg  500 mcg Oral Daily Niko Manning MD        lactated ringers IV soln infusion   IntraVENous Continuous Niko Manning MD 75 mL/hr at 12/13/23 1702 New Bag at 12/13/23 1702    sodium chloride flush 0.9 % injection 5-40 mL  5-40 mL IntraVENous 2 times per day Niko Manning MD   10 mL at 12/13/23 2136    sodium chloride flush 0.9 % injection 5-40 mL  5-40 mL IntraVENous PRN Niko Manning MD        0.9 % sodium chloride infusion   IntraVENous PRN Niko Manning MD        acetaminophen (TYLENOL) tablet 650 mg  650 mg Oral Q4H PRN Niko Manning MD        HYDROmorphone (DILAUDID) injection 0.25 mg  0.25 mg IntraVENous Q3H PRN Niko Manning MD        Or    HYDROmorphone (DILAUDID) injection 0.5 mg  0.5 mg IntraVENous Q3H PRN Niko Manning MD        ceFAZolin Sodium (ANCEF) 3,000 mg in sodium chloride 0.9 % 100 mL (mini-bag)  3,000 mg

## 2023-12-14 NOTE — CARE COORDINATION
Spoke with pt in room. Pt confirmed she is from home with her spouse. She has a PCP and insurance. No financial needs at this time. CM offered [facility] at discharge since the pt has been active with them before. Pt denied and stated that she is working with [facility] outpatient PT. The pt has a walker and a cane at home. The pt uses the walker PRN instead of the cane because it's easier to maneuver herself with it. Denies needs. Discharge plan to return home at discharge with no needs. CM is available if needed. 12/14/23 0932   Service Assessment   Patient Orientation Alert and Oriented   Cognition Alert   History Provided By Patient;Medical Record   Primary Caregiver Self   Support Systems Spouse/Significant Other;Family Members   Patient's Healthcare Decision Maker is: Legal Next of Kin   PCP Verified by CM Yes   Prior Functional Level Independent in ADLs/IADLs   Current Functional Level Independent in ADLs/IADLs   Can patient return to prior living arrangement Yes   Ability to make needs known: Good   Family able to assist with home care needs: Yes   Would you like for me to discuss the discharge plan with any other family members/significant others, and if so, who?  No   Financial Resources Other (Comment)  (Commercial)   [employer] Other (Comment)  (Doing outpatient physical therapy)

## 2023-12-15 VITALS
SYSTOLIC BLOOD PRESSURE: 112 MMHG | OXYGEN SATURATION: 96 % | HEART RATE: 108 BPM | RESPIRATION RATE: 18 BRPM | DIASTOLIC BLOOD PRESSURE: 73 MMHG | BODY MASS INDEX: 41.32 KG/M2 | TEMPERATURE: 98.1 F | WEIGHT: 279 LBS | HEIGHT: 69 IN

## 2023-12-15 PROBLEM — Z96.652 S/P TOTAL KNEE REPLACEMENT, LEFT: Status: ACTIVE | Noted: 2023-07-11

## 2023-12-15 LAB
ANION GAP SERPL CALCULATED.3IONS-SCNC: 10 MMOL/L (ref 4–16)
BUN SERPL-MCNC: 17 MG/DL (ref 6–23)
CALCIUM SERPL-MCNC: 8.3 MG/DL (ref 8.3–10.6)
CHLORIDE BLD-SCNC: 102 MMOL/L (ref 99–110)
CO2: 27 MMOL/L (ref 21–32)
CREAT SERPL-MCNC: 0.8 MG/DL (ref 0.6–1.1)
GFR SERPL CREATININE-BSD FRML MDRD: >60 ML/MIN/1.73M2
GLUCOSE SERPL-MCNC: 120 MG/DL (ref 70–99)
HCT VFR BLD CALC: 31 % (ref 37–47)
HEMOGLOBIN: 9.2 GM/DL (ref 12.5–16)
MCH RBC QN AUTO: 22.8 PG (ref 27–31)
MCHC RBC AUTO-ENTMCNC: 29.7 % (ref 32–36)
MCV RBC AUTO: 76.9 FL (ref 78–100)
PDW BLD-RTO: 15.3 % (ref 11.7–14.9)
PLATELET # BLD: 314 K/CU MM (ref 140–440)
PMV BLD AUTO: 10.4 FL (ref 7.5–11.1)
POTASSIUM SERPL-SCNC: 4.8 MMOL/L (ref 3.5–5.1)
RBC # BLD: 4.03 M/CU MM (ref 4.2–5.4)
SODIUM BLD-SCNC: 139 MMOL/L (ref 135–145)
WBC # BLD: 13.7 K/CU MM (ref 4–10.5)

## 2023-12-15 PROCEDURE — 94150 VITAL CAPACITY TEST: CPT

## 2023-12-15 PROCEDURE — 85027 COMPLETE CBC AUTOMATED: CPT

## 2023-12-15 PROCEDURE — 6360000002 HC RX W HCPCS: Performed by: ORTHOPAEDIC SURGERY

## 2023-12-15 PROCEDURE — 36415 COLL VENOUS BLD VENIPUNCTURE: CPT

## 2023-12-15 PROCEDURE — 94761 N-INVAS EAR/PLS OXIMETRY MLT: CPT

## 2023-12-15 PROCEDURE — 97110 THERAPEUTIC EXERCISES: CPT

## 2023-12-15 PROCEDURE — 80048 BASIC METABOLIC PNL TOTAL CA: CPT

## 2023-12-15 PROCEDURE — 94664 DEMO&/EVAL PT USE INHALER: CPT

## 2023-12-15 PROCEDURE — 97530 THERAPEUTIC ACTIVITIES: CPT

## 2023-12-15 PROCEDURE — 6370000000 HC RX 637 (ALT 250 FOR IP): Performed by: ORTHOPAEDIC SURGERY

## 2023-12-15 PROCEDURE — G0378 HOSPITAL OBSERVATION PER HR: HCPCS

## 2023-12-15 PROCEDURE — 6370000000 HC RX 637 (ALT 250 FOR IP): Performed by: INTERNAL MEDICINE

## 2023-12-15 PROCEDURE — 6360000002 HC RX W HCPCS: Performed by: INTERNAL MEDICINE

## 2023-12-15 PROCEDURE — 97116 GAIT TRAINING THERAPY: CPT

## 2023-12-15 PROCEDURE — 2580000003 HC RX 258: Performed by: ORTHOPAEDIC SURGERY

## 2023-12-15 RX ORDER — ASPIRIN 325 MG
325 TABLET ORAL 2 TIMES DAILY
Qty: 30 TABLET | Refills: 0 | Status: SHIPPED | OUTPATIENT
Start: 2023-12-15

## 2023-12-15 RX ORDER — OXYCODONE HYDROCHLORIDE AND ACETAMINOPHEN 5; 325 MG/1; MG/1
2 TABLET ORAL EVERY 6 HOURS PRN
Qty: 40 TABLET | Refills: 0 | Status: SHIPPED | OUTPATIENT
Start: 2023-12-15 | End: 2023-12-22

## 2023-12-15 RX ORDER — DOCUSATE SODIUM 100 MG/1
100 CAPSULE, LIQUID FILLED ORAL DAILY PRN
Qty: 30 CAPSULE | Refills: 0 | Status: SHIPPED | OUTPATIENT
Start: 2023-12-15

## 2023-12-15 RX ORDER — KETOROLAC TROMETHAMINE 30 MG/ML
30 INJECTION, SOLUTION INTRAMUSCULAR; INTRAVENOUS ONCE
Status: COMPLETED | OUTPATIENT
Start: 2023-12-15 | End: 2023-12-15

## 2023-12-15 RX ORDER — CEPHALEXIN 250 MG/1
250 CAPSULE ORAL EVERY 6 HOURS SCHEDULED
Status: DISCONTINUED | OUTPATIENT
Start: 2023-12-15 | End: 2023-12-15 | Stop reason: HOSPADM

## 2023-12-15 RX ORDER — CEFADROXIL 500 MG/1
500 CAPSULE ORAL 2 TIMES DAILY
Status: DISCONTINUED | OUTPATIENT
Start: 2023-12-15 | End: 2023-12-15 | Stop reason: CLARIF

## 2023-12-15 RX ADMIN — TRAZODONE HYDROCHLORIDE 50 MG: 50 TABLET ORAL at 00:00

## 2023-12-15 RX ADMIN — SODIUM CHLORIDE 3000 MG: 900 INJECTION INTRAVENOUS at 04:38

## 2023-12-15 RX ADMIN — OXYCODONE AND ACETAMINOPHEN 1 TABLET: 5; 325 TABLET ORAL at 11:47

## 2023-12-15 RX ADMIN — ENOXAPARIN SODIUM 40 MG: 100 INJECTION SUBCUTANEOUS at 09:32

## 2023-12-15 RX ADMIN — SERTRALINE HYDROCHLORIDE 100 MG: 50 TABLET ORAL at 09:31

## 2023-12-15 RX ADMIN — OXYCODONE AND ACETAMINOPHEN 1 TABLET: 5; 325 TABLET ORAL at 06:37

## 2023-12-15 RX ADMIN — KETOROLAC TROMETHAMINE 30 MG: 30 INJECTION, SOLUTION INTRAMUSCULAR at 11:47

## 2023-12-15 RX ADMIN — Medication 100 MG: at 09:31

## 2023-12-15 RX ADMIN — PANTOPRAZOLE SODIUM 40 MG: 40 TABLET, DELAYED RELEASE ORAL at 06:37

## 2023-12-15 RX ADMIN — CEPHALEXIN 250 MG: 250 CAPSULE ORAL at 11:55

## 2023-12-15 RX ADMIN — CYANOCOBALAMIN TAB 1000 MCG 500 MCG: 1000 TAB at 09:31

## 2023-12-15 RX ADMIN — SODIUM CHLORIDE, PRESERVATIVE FREE 10 ML: 5 INJECTION INTRAVENOUS at 09:32

## 2023-12-15 ASSESSMENT — PAIN DESCRIPTION - ORIENTATION
ORIENTATION: LEFT
ORIENTATION: LEFT

## 2023-12-15 ASSESSMENT — PAIN SCALES - GENERAL
PAINLEVEL_OUTOF10: 7
PAINLEVEL_OUTOF10: 2
PAINLEVEL_OUTOF10: 5
PAINLEVEL_OUTOF10: 8

## 2023-12-15 ASSESSMENT — PAIN DESCRIPTION - LOCATION
LOCATION: LEG
LOCATION: KNEE

## 2023-12-15 ASSESSMENT — PAIN DESCRIPTION - DESCRIPTORS
DESCRIPTORS: ACHING
DESCRIPTORS: ACHING

## 2023-12-15 ASSESSMENT — PAIN SCALES - WONG BAKER
WONGBAKER_NUMERICALRESPONSE: 2
WONGBAKER_NUMERICALRESPONSE: 2

## 2023-12-15 ASSESSMENT — PAIN - FUNCTIONAL ASSESSMENT
PAIN_FUNCTIONAL_ASSESSMENT: ACTIVITIES ARE NOT PREVENTED
PAIN_FUNCTIONAL_ASSESSMENT: ACTIVITIES ARE NOT PREVENTED

## 2023-12-15 NOTE — ANESTHESIA POSTPROCEDURE EVALUATION
Department of Anesthesiology  Postprocedure Note    Patient: Hannah Gaxiola  MRN: [id number]  YOB: 1960  Date of evaluation: 12/15/2023    Procedure Summary     Date: 12/13/23 Room / Location: [address] [place] [address]    Anesthesia Start: 9538 Anesthesia Stop: 6347    Procedure: LEFT KNEE TOTAL ARTHROPLASTY ROBOTIC (Left: Knee) Diagnosis:       Primary osteoarthritis of left knee      Chronic pain of left knee      (Primary osteoarthritis of left knee [M17.12])      (Chronic pain of left knee [B20.341, G89.29])    Surgeons: Soni Currie MD Responsible Provider: Carlos Kimble MD    Anesthesia Type: General ASA Status: 3          Anesthesia Type: General    Jeimy Phase I: Jeimy Score: 9    Jeimy Phase II:      Anesthesia Post Evaluation    Patient location during evaluation: PACU  Patient participation: complete - patient participated  Level of consciousness: awake  Pain score: 1  Airway patency: patent  Nausea & Vomiting: no nausea  Cardiovascular status: hemodynamically stable  Respiratory status: nasal cannula  Hydration status: euvolemic  Multimodal analgesia pain management approach        No notable events documented.

## 2023-12-15 NOTE — DISCHARGE SUMMARY
for up to 7 days. Intended supply: 7 days. Take lowest dose possible to manage pain Max Daily Amount: 8 tablets            CONTINUE taking these medications      albuterol sulfate  (90 Base) MCG/ACT inhaler  Commonly known as: PROVENTIL;VENTOLIN;PROAIR     cefadroxil 500 MG capsule  Commonly known as: DURICEF  Take 1 capsule by mouth 2 times daily     celecoxib 100 MG capsule  Commonly known as: CELEBREX     coenzyme Q10 100 MG Caps capsule     EMERGEN-C VITAMIN C LITE PO     omeprazole 40 MG delayed release capsule  Commonly known as: PRILOSEC     rOPINIRole 0.25 MG tablet  Commonly known as: REQUIP     sertraline 100 MG tablet  Commonly known as: ZOLOFT     vitamin B-12 500 MCG tablet  Commonly known as: CYANOCOBALAMIN     vitamin D3 125 MCG (5000 UT) Caps               Where to Get Your Medications        These medications were sent to [address]  [address] [facility], [address] [facility]      Phone: [phone]   aspirin 325 MG tablet  docusate sodium 100 MG capsule  oxyCODONE-acetaminophen 5-325 MG per tablet          Plan  Discharge instructions were given along with prescriptions for pain medications as well as DVT prophylaxis. Plan is to follow up in 2-3 weeks for a wound check and x-rays. Patient was instructed on the use of pain medications, the signs and symptoms of infection or blood clot, and was given our number to call should they have any questions or concerns following discharge.     Kayla Steward MD, MD

## 2023-12-15 NOTE — DISCHARGE INSTRUCTIONS
There is a watertight impervious tape like dressing over the incision. You may shower and clean the leg as needed. You may cover the area with a gauze bandage and tape as needed. You can leave the incision open to air after a 5 days if no drainage. Remove the clear tape 2 weeks after surgery. You can rewrap the Ace wrap to help control swelling of support of the knee as needed    Use ice as needed to help with pain and swelling. Use the compression stockings for the first few days or longer to help control postoperative swelling and aid in circulation    Begin physical therapy as an outpatient a few days after surgery. They should be calling you to set up the appointment. If you have not heard from them within 2 days, call the office to ensure that therapy is scheduled    Taper off of the narcotic pain medication as pain allows. You may substitute a dose of Tylenol or ibuprofen in place of the narcotic pain medication as the pain improves. Take your blood thinner as prescribed for 2 weeks to help prevent blood clots in the leg. Aspirin has been prescribed    Restart your antibiotic therapy as prescribed by the infectious disease consultant. Practice range of motion exercises multiple times a day as instructed by physical therapy. You should practice fully straightening and stretching the back of the knee as well as practice fully bending and stretching the front of the knee. Call the office if there is any concern for wound healing problems, drainage, severe swelling, discoloration, or other medical issues.

## 2023-12-21 ENCOUNTER — TELEPHONE (OUTPATIENT)
Dept: ORTHOPEDIC SURGERY | Age: 63
End: 2023-12-21

## 2023-12-21 DIAGNOSIS — Z96.652 STATUS POST LEFT KNEE REPLACEMENT: ICD-10-CM

## 2023-12-21 RX ORDER — OXYCODONE HYDROCHLORIDE AND ACETAMINOPHEN 5; 325 MG/1; MG/1
1 TABLET ORAL EVERY 6 HOURS PRN
Qty: 28 TABLET | Refills: 0 | Status: CANCELLED | OUTPATIENT
Start: 2023-12-22 | End: 2023-12-29

## 2023-12-21 RX ORDER — OXYCODONE HYDROCHLORIDE AND ACETAMINOPHEN 5; 325 MG/1; MG/1
1 TABLET ORAL EVERY 8 HOURS PRN
Qty: 21 TABLET | Refills: 0 | Status: CANCELLED | OUTPATIENT
Start: 2023-12-22 | End: 2023-12-29

## 2023-12-21 NOTE — TELEPHONE ENCOUNTER
Spoke with [facility] [phone].    Oked to release the medication on 12/23/2023.  [facility] isnt going to be open from 12/22-12/26.    New pharmacy needed: [facility] [place]

## 2023-12-21 NOTE — TELEPHONE ENCOUNTER
Patient came in today d/t concern of a blood clot. Pt stated she had increased swelling, tightness and discoloring on the lower leg.    She was evaluated and ruled out for blood clot symptoms in office by GRISEL Singh. Advised if negative symptoms increase in the next week to call the office for an order for an ultrasound.

## 2023-12-25 ENCOUNTER — HOSPITAL ENCOUNTER (EMERGENCY)
Age: 63
Discharge: HOME | End: 2023-12-25
Payer: COMMERCIAL

## 2023-12-25 VITALS
DIASTOLIC BLOOD PRESSURE: 78 MMHG | OXYGEN SATURATION: 94 % | TEMPERATURE: 98.7 F | HEART RATE: 94 BPM | RESPIRATION RATE: 18 BRPM | SYSTOLIC BLOOD PRESSURE: 134 MMHG

## 2023-12-25 DIAGNOSIS — J40 BRONCHITIS: ICD-10-CM

## 2023-12-25 DIAGNOSIS — J01.90 ACUTE SINUSITIS, RECURRENCE NOT SPECIFIED, UNSPECIFIED LOCATION: Primary | ICD-10-CM

## 2023-12-25 PROCEDURE — 99283 EMERGENCY DEPT VISIT LOW MDM: CPT

## 2023-12-25 RX ORDER — ALBUTEROL SULFATE 90 UG/1
2 AEROSOL, METERED RESPIRATORY (INHALATION) EVERY 6 HOURS PRN
Qty: 18 G | Refills: 0 | Status: SHIPPED | OUTPATIENT
Start: 2023-12-25

## 2023-12-25 RX ORDER — DOXYCYCLINE HYCLATE 100 MG
100 TABLET ORAL 2 TIMES DAILY
Qty: 20 TABLET | Refills: 0 | Status: SHIPPED | OUTPATIENT
Start: 2023-12-25 | End: 2024-01-04

## 2023-12-25 RX ORDER — PREDNISONE 20 MG/1
20 TABLET ORAL DAILY
Qty: 3 TABLET | Refills: 0 | Status: SHIPPED | OUTPATIENT
Start: 2023-12-25 | End: 2023-12-28

## 2023-12-25 NOTE — ED PROVIDER NOTES
[address] ENCOUNTER        Pt Name: Ana Schmidt  MRN: [id number]  [facility] 1960  Date of evaluation: 12/25/2023  Provider: KSENIA Godfrey - FABIO  PCP: Camelia Segura MD    FIORELLA. I have evaluated this patient. Triage CHIEF COMPLAINT       Chief Complaint   Patient presents with    Nasal Congestion     Chest congestion, trouble breathing         HISTORY OF PRESENT ILLNESS      Chief Complaint: Cough, congestion, shortness of breath    Ana Schmidt is a 61 y.o. female who presents for evaluation of cough, congestion, sinus drainage for 3 weeks. Patient states she has been taking Mucinex at home and that this was helping. She states over the last couple days she has noticed some chest chest tightness at times with some mild shortness of breath. She denies any history of asthma or COPD but has had to use an inhaler in the past.  She denies any fevers or chills, body aches or headaches recently. She feels better in terms of shortness of breath this morning but states she wanted to come in and get things checked out. Of note, the patient has had bilateral knee replacements this year, 1 in July and 1 about 12 days ago. Her July replacement ultimately got infected and required revision and she is on cefadroxil for 1 year secondary to this. She has not had any issues. Denies any increase in leg pain since having her most recent replacement. No prior history of DVT or PE. Nursing Notes were all reviewed and agreed with or any disagreements were addressed in the HPI. REVIEW OF SYSTEMS     Pertinent ROS as noted in HPI.       PAST MEDICAL HISTORY     Past Medical History:   Diagnosis Date    Depression     Elevated serum alkaline phosphatase level     ANDRÉS on CPAP     Pre-diabetes 06/2015    RLS (restless legs syndrome)     Venous (peripheral) insufficiency 07/2014    reflux in Rt GSV and Lt GSV-

## 2024-01-04 ENCOUNTER — OFFICE VISIT (OUTPATIENT)
Dept: ORTHOPEDIC SURGERY | Age: 64
End: 2024-01-04

## 2024-01-04 VITALS — HEIGHT: 69 IN | WEIGHT: 279 LBS | BODY MASS INDEX: 41.32 KG/M2

## 2024-01-04 DIAGNOSIS — Z96.652 STATUS POST TOTAL LEFT KNEE REPLACEMENT: Primary | ICD-10-CM

## 2024-01-04 PROCEDURE — 99024 POSTOP FOLLOW-UP VISIT: CPT

## 2024-01-04 RX ORDER — OXYCODONE HYDROCHLORIDE AND ACETAMINOPHEN 5; 325 MG/1; MG/1
1 TABLET ORAL EVERY 8 HOURS PRN
Qty: 21 TABLET | Refills: 0 | Status: SHIPPED | OUTPATIENT
Start: 2024-01-04 | End: 2024-01-11

## 2024-01-04 ASSESSMENT — ENCOUNTER SYMPTOMS
SHORTNESS OF BREATH: 0
RHINORRHEA: 0
BACK PAIN: 0
FACIAL SWELLING: 0
NAUSEA: 0
COUGH: 0

## 2024-01-04 NOTE — PATIENT INSTRUCTIONS
Continue weight-bearing as tolerated.  Continue range of motion exercises as instructed.  Ice and elevate as needed.  Tylenol or Motrin for pain.   Follow up in 3 weeks

## 2024-01-04 NOTE — PROGRESS NOTES
1/4/2024   Chief Complaint   Patient presents with    Post-Op Check     Left TKA DOS 12/13/23        History of Present Illness:                             Donell Benjamin is a 63 y.o. female returning to the office today for continued postoperative management regarding her left TKA, DOS 12/13/2023.  Patient states she appears to be doing well with her healing process and she has been able to ambulate without the assistance of a walker or cane.  Patient states she is able to reach full knee extension and 120 degrees of active flexion in therapy.  She denies any signs of infection from the incision area such as erythema, fluctuance, drainage, or surrounding temperature changes.    Patient returns to the office today for FU of the left TKA DOS 12/13/23. Pt states pain today is 3/10. Overall she is doing well. Pt is working with PT and will notice a little increase pain after therapy. Pt denies any fever or cold chills    Medical History  Patient's medications, allergies, past medical, surgical, social and family histories were reviewed and updated as appropriate.    Review of Systems   Constitutional:  Negative for fever.   HENT:  Negative for facial swelling and rhinorrhea.    Respiratory:  Negative for cough and shortness of breath.    Cardiovascular:  Negative for chest pain.   Gastrointestinal:  Negative for nausea.   Musculoskeletal:  Positive for arthralgias, gait problem and joint swelling. Negative for back pain, myalgias, neck pain and neck stiffness.   Skin:  Negative for pallor and rash.   Neurological:  Negative for facial asymmetry and speech difficulty.   Psychiatric/Behavioral:  Negative for agitation and confusion.                                                Examination:  General Exam:  Vitals: Ht 1.753 m (5' 9\")   Wt 126.6 kg (279 lb)   BMI 41.20 kg/m²    Physical Exam  Constitutional:       General: She is not in acute distress.     Appearance: Normal appearance. She is obese. She is not

## 2024-01-04 NOTE — PROGRESS NOTES
Patient returns to the office today for FU of the left TKA DOS 12/13/23. Pt states pain today is 3/10. Overall she is doing well. Pt is working with PT and will notice a little increase pain after therapy. Pt denies any fever or cold chills

## 2024-01-10 ENCOUNTER — TELEPHONE (OUTPATIENT)
Dept: ORTHOPEDIC SURGERY | Age: 64
End: 2024-01-10

## 2024-01-10 NOTE — TELEPHONE ENCOUNTER
Patient called this AM describing muscular pain starting on the back side of her knee to her \"tailbone and mid hip\" and pain sometimes goes down into the ankle as well.     Would you like patient to come in earlier than her post op appt on 1/25/2024? Patient denies any other Dx of back issues.

## 2024-01-12 ENCOUNTER — TELEPHONE (OUTPATIENT)
Dept: ORTHOPEDIC SURGERY | Age: 64
End: 2024-01-12

## 2024-01-12 DIAGNOSIS — Z96.652 STATUS POST TOTAL LEFT KNEE REPLACEMENT: Primary | ICD-10-CM

## 2024-01-12 RX ORDER — HYDROCODONE BITARTRATE AND ACETAMINOPHEN 5; 325 MG/1; MG/1
1 TABLET ORAL EVERY 4 HOURS PRN
Qty: 30 TABLET | Refills: 0 | Status: SHIPPED | OUTPATIENT
Start: 2024-01-12 | End: 2024-01-17

## 2024-01-12 NOTE — PROGRESS NOTES
Patient refill for Norco prescribed for 5 days every 8 hours.  She should take the least amount possible for pain and hopefully this will be her last prescription for opioid derived pain medication.  She should be able to switch to over-the-counter medications from here on out.    Hoang Richey PA-C

## 2024-01-12 NOTE — TELEPHONE ENCOUNTER
Patient called to follow up about \"tailbone and mid hip pain\". Patient states she believes it is her back and wants to know if she can have her pain medication refilled, as well as, muscle relaxers to calm the back and hip as well.     Patient was educated Dr. Mar and Brian Richey PA-C would be notified. Patient educated that she should continue home exercises, PT if applicable, ice on all areas of pain, and continue medication as Rx'd. Patient verbalized understanding of education.     Please send refill and new Rx to: [facility] in [place].

## 2024-01-14 NOTE — ASSESSMENT & PLAN NOTE
Had left TKA done on 12/13/2023, no complication. Right TKA is doing well. Labs are wnl. Continue cefadroxil, 3 month prescription was placed. .

## 2024-01-14 NOTE — PROGRESS NOTES
1/18/2024         Referring Physician: No ref. provider found  Primary Care Physician: Marin Jones MD    Impression/Plan:   Diagnosis Orders   1. 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 Right TKA is doing well. Labs are wnl. Continue cefadroxil, 3 month prescription was placed. .       Return in about 3 months (around 4/18/2024).    31 minutes was spent in the encounter; more than 50% of the face-to-face time was spent with the patient providing counseling and coordination of care.      History: Donell Benjamin is a 63 y.o.  female presenting today for follow-up.  She was seen in the hospital during 8/4/2023 admission for pain, swelling, and redness of her right knee joint status post right total knee arthroplasty on 7/10/2023.  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  I&D and poly exchange was done on 8/4/2023.  Right knee culture and blood cultures were positive for MSSA.  She was discharged on 8/10/2023 to complete a 6-week course of intravenous cefazolin and oral rifampicin with a tentative end date of 9/17/2023.  And the plan to transition to oral antibiotics for at least 6 months.She is here with her : Maricruz Denies fatigue, fever, chills, night sweats and weight loss. Right knee incision has healed.  9/27/2023: Last visit Bactrim was prescribed.  However, she developed itching to it.  She has stopped the Bactrim.A day after starting Bactrim she felt light-headed and weak. Itching started on the 5th day of Bactrim. She stopped Bactrim one week ago but continues to itch.   10/4/2023: At last visit she was diagnosed with drug-induced rash due to Bactrim.  Prednisone was prescribed.  Doxycycline was

## 2024-01-15 LAB
A/G RATIO: 1.5
ALBUMIN SERPL-MCNC: 4.2 G/DL
ALP BLD-CCNC: 166 U/L
ALT SERPL-CCNC: 13 U/L
AST SERPL-CCNC: 21 U/L
BASOPHILS ABSOLUTE: 0 /ΜL
BASOPHILS RELATIVE PERCENT: 0 %
BILIRUB SERPL-MCNC: 0.4 MG/DL (ref 0.1–1.4)
BILIRUBIN DIRECT: 0.1 MG/DL
BILIRUBIN, INDIRECT: 0
BUN BLDV-MCNC: 15 MG/DL
C-REACTIVE PROTEIN: 0.85
CALCIUM SERPL-MCNC: 10.1 MG/DL
CHLORIDE BLD-SCNC: 102 MMOL/L
CO2: 27 MMOL/L
CREAT SERPL-MCNC: 0.8 MG/DL
EGFR: 83
EOSINOPHILS ABSOLUTE: 0 /ΜL
EOSINOPHILS RELATIVE PERCENT: 0 %
GLOBULIN: 2.8
GLUCOSE BLD-MCNC: 99 MG/DL
HCT VFR BLD CALC: 36.3 % (ref 36–46)
HEMOGLOBIN: 11.4 G/DL (ref 12–16)
LYMPHOCYTES ABSOLUTE: 0 /ΜL
LYMPHOCYTES RELATIVE PERCENT: 0 %
MCH RBC QN AUTO: 22.8 PG
MCHC RBC AUTO-ENTMCNC: 31.4 G/DL
MCV RBC AUTO: 72.5 FL
MONOCYTES ABSOLUTE: 0 /ΜL
MONOCYTES RELATIVE PERCENT: 0 %
NEUTROPHILS ABSOLUTE: 0 /ΜL
NEUTROPHILS RELATIVE PERCENT: 0 %
PLATELET # BLD: 328 K/ΜL
PMV BLD AUTO: 10.9 FL
POTASSIUM SERPL-SCNC: 4.3 MMOL/L
PROTEIN TOTAL: 7 G/DL
RBC # BLD: 5.01 10^6/ΜL
SEDIMENTATION RATE, ERYTHROCYTE: 48
SODIUM BLD-SCNC: 139 MMOL/L
WBC # BLD: 6.7 10^3/ML

## 2024-01-18 ENCOUNTER — OFFICE VISIT (OUTPATIENT)
Dept: INFECTIOUS DISEASES | Age: 64
End: 2024-01-18
Payer: COMMERCIAL

## 2024-01-18 VITALS
DIASTOLIC BLOOD PRESSURE: 71 MMHG | BODY MASS INDEX: 40.99 KG/M2 | HEART RATE: 88 BPM | WEIGHT: 277.6 LBS | RESPIRATION RATE: 18 BRPM | SYSTOLIC BLOOD PRESSURE: 160 MMHG

## 2024-01-18 DIAGNOSIS — Z96.652 STATUS POST TOTAL LEFT KNEE REPLACEMENT: Primary | ICD-10-CM

## 2024-01-18 DIAGNOSIS — T84.53XD INFECTION OF TOTAL RIGHT KNEE REPLACEMENT, SUBSEQUENT ENCOUNTER: Primary | ICD-10-CM

## 2024-01-18 PROCEDURE — 99214 OFFICE O/P EST MOD 30 MIN: CPT | Performed by: INTERNAL MEDICINE

## 2024-01-18 RX ORDER — CEFADROXIL 500 MG/1
500 CAPSULE ORAL 2 TIMES DAILY
Qty: 180 CAPSULE | Refills: 0 | Status: SHIPPED | OUTPATIENT
Start: 2024-02-01 | End: 2024-05-01

## 2024-01-18 RX ORDER — HYDROCODONE BITARTRATE AND ACETAMINOPHEN 5; 325 MG/1; MG/1
1 TABLET ORAL EVERY 8 HOURS PRN
Qty: 20 TABLET | Refills: 0 | Status: SHIPPED | OUTPATIENT
Start: 2024-01-18 | End: 2024-01-25

## 2024-01-25 NOTE — PROGRESS NOTES
Patient seen in office today for: PO 6wk Left TKA, DOS 12/13/2023    Patient reports 0/10 pain.  RICE and medication are effective to alleviate pain and reduce swelling.     Pain worsened by: Patient reports painful ROM.    Patient continues with physical therapy

## 2024-01-26 ENCOUNTER — OFFICE VISIT (OUTPATIENT)
Dept: ORTHOPEDIC SURGERY | Age: 64
End: 2024-01-26

## 2024-01-26 VITALS — RESPIRATION RATE: 18 BRPM | OXYGEN SATURATION: 97 % | TEMPERATURE: 97.3 F | HEART RATE: 84 BPM

## 2024-01-26 DIAGNOSIS — Z96.652 STATUS POST TOTAL LEFT KNEE REPLACEMENT: ICD-10-CM

## 2024-01-26 DIAGNOSIS — M54.16 LUMBAR BACK PAIN WITH RADICULOPATHY AFFECTING LEFT LOWER EXTREMITY: Primary | ICD-10-CM

## 2024-01-26 PROCEDURE — 99024 POSTOP FOLLOW-UP VISIT: CPT | Performed by: ORTHOPAEDIC SURGERY

## 2024-01-26 RX ORDER — CYCLOBENZAPRINE HCL 10 MG
10 TABLET ORAL 2 TIMES DAILY PRN
Qty: 20 TABLET | Refills: 0 | Status: SHIPPED | OUTPATIENT
Start: 2024-01-26 | End: 2024-02-05

## 2024-01-26 RX ORDER — HYDROCODONE BITARTRATE AND ACETAMINOPHEN 5; 325 MG/1; MG/1
1 TABLET ORAL 2 TIMES DAILY PRN
Qty: 14 TABLET | Refills: 0 | Status: SHIPPED | OUTPATIENT
Start: 2024-01-26 | End: 2024-02-02

## 2024-01-26 RX ORDER — IBUPROFEN 600 MG/1
600 TABLET ORAL 3 TIMES DAILY PRN
Qty: 60 TABLET | Refills: 1 | Status: SHIPPED | OUTPATIENT
Start: 2024-01-26

## 2024-01-26 ASSESSMENT — ENCOUNTER SYMPTOMS
SHORTNESS OF BREATH: 0
CHEST TIGHTNESS: 0
COLOR CHANGE: 0

## 2024-01-26 NOTE — PATIENT INSTRUCTIONS
Continue weight-bearing as tolerated.  Continue range of motion exercises as instructed.  Ice and elevate as needed.  Tylenol or Motrin for pain.  Start Flexeril and Ibuprofen  Follow up in 6 weeks

## 2024-01-26 NOTE — PROGRESS NOTES
1/26/2024   Chief Complaint   Patient presents with    Post-Op Check     PO 6wk Left TKA, DOS 12/13/2023        History of Present Illness:                             Donell Benjamin is a 63 y.o. female who returns today for follow-up of her left total knee replacement    Patient seen in office today for: PO 6wk Left TKA, DOS 12/13/2023     Patient reports 0/10 pain.  RICE and medication are effective to alleviate pain and reduce swelling.      Pain worsened by: Patient reports painful ROM.     Patient continues with physical therapy     Medical History  Patient's medications, allergies, past medical, surgical, social and family histories were reviewed and updated as appropriate.      Review of Systems   Constitutional:  Negative for activity change and fever.   Respiratory:  Negative for chest tightness and shortness of breath.    Cardiovascular:  Negative for chest pain.   Skin:  Negative for color change.   Neurological:  Negative for dizziness and weakness.   Psychiatric/Behavioral:  The patient is not nervous/anxious.                                                Examination:  General Exam:  Vitals: Pulse 84   Temp 97.3 °F (36.3 °C)   Resp 18   SpO2 97%    Physical Exam     Left Lower Extremity:    The incision is well-healed.  No erythema, drainage, or induration.  Minimal resolving soft tissue swelling present throughout the soft tissues of the knee.  Range of motion is present from full extension to 120 degrees of flexion.  Calf is soft and nontender.  Negative Homans sign.  Sensation and motor function is intact at the knee and ankle.    Good active range of motion present with dorsiflexion plantarflexion of the ankle and intact strength.  Sensation is intact to light touch in the foot and toes    Diagnostic testing:  X-rays reviewed in office, I independently reviewed the films in the office today:     Previous x-rays show well aligned total knee replacement with no complication    Office

## 2024-02-14 ENCOUNTER — TELEPHONE (OUTPATIENT)
Dept: ORTHOPEDIC SURGERY | Age: 64
End: 2024-02-14

## 2024-02-14 NOTE — TELEPHONE ENCOUNTER
Patient called and advised she has been to PT for her back but has had an increase in pain 8-9/10. She is scheduled for an injection in her back 2/20/2024, she would like to know if she can have pain medicine to get her through until the injection. Please advise.

## 2024-03-07 ENCOUNTER — OFFICE VISIT (OUTPATIENT)
Dept: ORTHOPEDIC SURGERY | Age: 64
End: 2024-03-07

## 2024-03-07 VITALS — HEART RATE: 84 BPM | OXYGEN SATURATION: 96 % | BODY MASS INDEX: 41.03 KG/M2 | HEIGHT: 69 IN | WEIGHT: 277 LBS

## 2024-03-07 DIAGNOSIS — Z96.652 STATUS POST TOTAL LEFT KNEE REPLACEMENT: Primary | ICD-10-CM

## 2024-03-07 PROCEDURE — 99024 POSTOP FOLLOW-UP VISIT: CPT

## 2024-03-07 ASSESSMENT — ENCOUNTER SYMPTOMS
BACK PAIN: 0
NAUSEA: 0
FACIAL SWELLING: 0
RHINORRHEA: 0
COUGH: 0
SHORTNESS OF BREATH: 0

## 2024-03-07 NOTE — PROGRESS NOTES
3/7/2024   Chief Complaint   Patient presents with    Post-Op Check     Left TKA DOS 12/13/23        History of Present Illness:                             Donell Benjamin is a 63 y.o. female returning to the office today for continued postoperative care regarding her left TKA, DOS 12/13/2023.  Patient states she is doing very well and has finished physical therapy.  She believes she has had a full recovery and has no pain symptoms at today's visit.  She has no concerns with her incision area.  Patient denies distal paresthesias or calf pain.    Medical History  Patient's medications, allergies, past medical, surgical, social and family histories were reviewed and updated as appropriate.    Review of Systems   Constitutional:  Negative for fever.   HENT:  Negative for facial swelling and rhinorrhea.    Respiratory:  Negative for cough and shortness of breath.    Cardiovascular:  Negative for chest pain.   Gastrointestinal:  Negative for nausea.   Musculoskeletal:  Positive for arthralgias. Negative for back pain, gait problem, joint swelling, myalgias, neck pain and neck stiffness.   Skin:  Negative for pallor and rash.   Neurological:  Negative for facial asymmetry and speech difficulty.   Psychiatric/Behavioral:  Negative for agitation and confusion.                                                Examination:  General Exam:  Vitals: Pulse 84   Ht 1.753 m (5' 9\")   Wt 125.6 kg (277 lb)   SpO2 96%   BMI 40.91 kg/m²    Physical Exam  Constitutional:       General: She is not in acute distress.     Appearance: Normal appearance. She is not ill-appearing.   HENT:      Nose: No rhinorrhea.   Eyes:      General: No scleral icterus.        Right eye: No discharge.         Left eye: No discharge.   Pulmonary:      Effort: Pulmonary effort is normal. No respiratory distress.      Breath sounds: No stridor.   Musculoskeletal:      Cervical back: Normal range of motion.      Comments: Left knee: Patient left knee

## 2024-04-15 LAB
A/G RATIO: 1.3 RATIO (ref 0.8–2.6)
ALBUMIN SERPL-MCNC: 4.4 G/DL (ref 3.5–5.2)
ALP BLD-CCNC: 160 U/L (ref 23–144)
ALT SERPL-CCNC: 19 U/L (ref 0–60)
AST SERPL-CCNC: 24 U/L (ref 0–55)
BILIRUB SERPL-MCNC: 0.3 MG/DL (ref 0–1.2)
BILIRUBIN DIRECT: <0.2 MG/DL (ref 0–0.4)
BILIRUBIN, INDIRECT: ABNORMAL MG/DL (ref 0–1.2)
BUN / CREAT RATIO: 21 (ref 7–25)
BUN BLDV-MCNC: 17 MG/DL (ref 3–29)
C REACTIVE PROTEIN (CRP), BODY FLUID: 1.01 MG/DL
CALCIUM SERPL-MCNC: 9.7 MG/DL (ref 8.5–10.5)
CHLORIDE BLD-SCNC: 101 MEQ/L (ref 96–110)
CO2: 29 MEQ/L (ref 19–32)
CREAT SERPL-MCNC: 0.8 MG/DL (ref 0.5–1.2)
ERYTHROCYTE SEDIMENTATION RATE: 48 MM/HR (ref 0–30)
ESTIMATED GLOMERULAR FILTRATION RATE CREATININE EQUATION: 83 MLS/MIN/1.73M2
FASTING STATUS: NORMAL
GLOBULIN: 3.5 G/DL (ref 1.9–3.6)
GLUCOSE BLD-MCNC: 99 MG/DL (ref 70–99)
HCT VFR BLD CALC: 40.3 % (ref 34–49)
HEMOGLOBIN: 13 G/DL (ref 11.2–15.7)
MCH RBC QN AUTO: 23.2 PG (ref 26–34)
MCHC RBC AUTO-ENTMCNC: 32.3 G/DL (ref 30.7–35.5)
MCV RBC AUTO: 71.8 FL (ref 80–100)
PDW BLD-RTO: 16.8 %
PLATELET # BLD: 351 K/UL (ref 140–400)
PMV BLD AUTO: 10.5 FL (ref 7.2–11.7)
POTASSIUM SERPL-SCNC: 4.4 MEQ/L (ref 3.4–5.3)
RBC # BLD: 5.61 M/UL (ref 3.95–5.26)
SODIUM BLD-SCNC: 140 MEQ/L (ref 135–148)
TOTAL PROTEIN: 7.9 G/DL (ref 6–8.3)
WBC # BLD: 9 K/UL (ref 3.5–10.9)

## 2024-04-17 NOTE — PROGRESS NOTES
4/18/2024         Referring Physician: No ref. provider found  Primary Care Physician: Marin Jones MD    Impression/Plan:   Diagnosis Orders   1. 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 CRP remains elevated. Clinically improving. Had a discussion with her about the duration of abx. She has completed a 6 month course but there remains a concern about hardware infection. She agrees to the continuation of  chronic antibiotic suppression.       Return in about 3 months (around 7/18/2024).    30 minutes was spent in the encounter; more than 50% of the face-to-face time was spent with the patient providing counseling and coordination of care.      History: Donell Benjamin is a 63 y.o.  female presenting today for follow-up.  She was seen in the hospital during 8/4/2023 admission for pain, swelling, and redness of her right knee joint status post right total knee arthroplasty on 7/10/2023.  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  I&D and poly exchange was done on 8/4/2023.  Right knee culture and blood cultures were positive for MSSA.  She was discharged on 8/10/2023 to complete a 6-week course of intravenous cefazolin and oral rifampicin with a tentative end date of 9/17/2023.  And the plan to transition to oral antibiotics for at least 6 months.She is here with her : Tay. Denies fatigue, fever, chills, night sweats and weight loss. Right knee incision has healed.  9/27/2023: Last visit Bactrim was prescribed.  However, she developed itching to it.  She has stopped the Bactrim.A day after starting Bactrim she felt light-headed and weak. Itching started on the 5th day of Bactrim. She stopped Bactrim one week ago but continues to itch.   10/4/2023: At last visit she was diagnosed with drug-induced rash due to Bactrim.  Prednisone was prescribed.  Doxycycline was

## 2024-04-17 NOTE — ASSESSMENT & PLAN NOTE
ESR elevated but it is reduced. CRP remains elevated. Clinically improving. Had a discussion with her about the duration of abx. She has completed a 6 month course but there remains a concern about hardware infection. She agrees to the continuation of  chronic antibiotic suppression.

## 2024-04-18 ENCOUNTER — OFFICE VISIT (OUTPATIENT)
Dept: INFECTIOUS DISEASES | Age: 64
End: 2024-04-18
Payer: COMMERCIAL

## 2024-04-18 VITALS
WEIGHT: 291.01 LBS | HEART RATE: 90 BPM | HEIGHT: 69 IN | OXYGEN SATURATION: 97 % | BODY MASS INDEX: 43.1 KG/M2 | SYSTOLIC BLOOD PRESSURE: 129 MMHG | DIASTOLIC BLOOD PRESSURE: 53 MMHG

## 2024-04-18 DIAGNOSIS — T84.53XD INFECTION OF TOTAL RIGHT KNEE REPLACEMENT, SUBSEQUENT ENCOUNTER: Primary | ICD-10-CM

## 2024-04-18 PROCEDURE — 99213 OFFICE O/P EST LOW 20 MIN: CPT | Performed by: INTERNAL MEDICINE

## 2024-04-18 RX ORDER — ROPINIROLE 0.5 MG/1
TABLET, FILM COATED ORAL
COMMUNITY
Start: 2024-03-25

## 2024-04-18 RX ORDER — ROPINIROLE 1 MG/1
TABLET, FILM COATED ORAL
COMMUNITY

## 2024-04-18 RX ORDER — PNV NO.95/FERROUS FUM/FOLIC AC 28MG-0.8MG
TABLET ORAL
COMMUNITY

## 2024-04-18 RX ORDER — MULTIVITAMIN WITH IRON
100 TABLET ORAL DAILY
COMMUNITY

## 2024-04-18 RX ORDER — CEFADROXIL 500 MG/1
500 CAPSULE ORAL 2 TIMES DAILY
Qty: 180 CAPSULE | Refills: 0 | Status: SHIPPED | OUTPATIENT
Start: 2024-04-18 | End: 2024-07-17

## 2024-04-18 RX ORDER — UBIDECARENONE 200 MG
CAPSULE ORAL
COMMUNITY

## 2024-04-18 RX ORDER — ROSUVASTATIN CALCIUM 10 MG/1
10 TABLET, COATED ORAL DAILY
COMMUNITY
Start: 2024-04-16

## 2024-08-02 LAB
A/G RATIO: 1.2 RATIO (ref 0.8–2.6)
ALBUMIN: 4.1 G/DL (ref 3.5–5.2)
ALP BLD-CCNC: 138 U/L (ref 23–144)
ALT SERPL-CCNC: 21 U/L (ref 0–60)
AST SERPL-CCNC: 28 U/L (ref 0–55)
BILIRUB SERPL-MCNC: 0.4 MG/DL (ref 0–1.2)
BILIRUBIN DIRECT: <0.2 MG/DL (ref 0–0.4)
BILIRUBIN, INDIRECT: NORMAL MG/DL (ref 0–1.2)
BUN / CREAT RATIO: 14 (ref 7–25)
BUN BLDV-MCNC: 13 MG/DL (ref 3–29)
C REACTIVE PROTEIN (CRP), BODY FLUID: 1.88 MG/DL
CALCIUM SERPL-MCNC: 10.1 MG/DL (ref 8.5–10.5)
CHLORIDE BLD-SCNC: 100 MEQ/L (ref 96–110)
CO2: 28 MEQ/L (ref 19–32)
CREAT SERPL-MCNC: 0.9 MG/DL (ref 0.5–1.2)
ESTIMATED GLOMERULAR FILTRATION RATE CREATININE EQUATION: 72 MLS/MIN/1.73M2
FASTING STATUS: NORMAL
GLOBULIN: 3.5 G/DL (ref 1.9–3.6)
GLUCOSE BLD-MCNC: 94 MG/DL (ref 70–99)
HCT VFR BLD CALC: 42.4 % (ref 34–49)
HEMOGLOBIN: 13.9 G/DL (ref 11.2–15.7)
MCH RBC QN AUTO: 25.8 PG (ref 26–34)
MCHC RBC AUTO-ENTMCNC: 32.8 G/DL (ref 30.7–35.5)
MCV RBC AUTO: 78.8 FL (ref 80–100)
PDW BLD-RTO: 13.5 %
PLATELET # BLD: 347 K/UL (ref 140–400)
PMV BLD AUTO: 10.7 FL (ref 7.2–11.7)
POTASSIUM SERPL-SCNC: 4.7 MEQ/L (ref 3.4–5.3)
RBC # BLD: 5.38 M/UL (ref 3.95–5.26)
SED RATE, AUTOMATED: 68 MM/HR (ref 0–30)
SODIUM BLD-SCNC: 141 MEQ/L (ref 135–148)
TOTAL PROTEIN: 7.6 G/DL (ref 6–8.3)
WBC # BLD: 11.7 K/UL (ref 3.5–10.9)

## 2024-08-07 ENCOUNTER — OFFICE VISIT (OUTPATIENT)
Dept: INFECTIOUS DISEASES | Age: 64
End: 2024-08-07
Payer: COMMERCIAL

## 2024-08-07 VITALS
SYSTOLIC BLOOD PRESSURE: 138 MMHG | WEIGHT: 293 LBS | BODY MASS INDEX: 44.86 KG/M2 | DIASTOLIC BLOOD PRESSURE: 90 MMHG | HEART RATE: 85 BPM

## 2024-08-07 DIAGNOSIS — T84.53XD INFECTION OF TOTAL RIGHT KNEE REPLACEMENT, SUBSEQUENT ENCOUNTER: Primary | ICD-10-CM

## 2024-08-07 PROCEDURE — 99213 OFFICE O/P EST LOW 20 MIN: CPT | Performed by: INTERNAL MEDICINE

## 2024-08-07 RX ORDER — CEFADROXIL 500 MG/1
500 CAPSULE ORAL 2 TIMES DAILY
Qty: 180 CAPSULE | Refills: 0 | Status: SHIPPED | OUTPATIENT
Start: 2024-08-07 | End: 2024-11-05

## 2024-08-07 NOTE — PROGRESS NOTES
Vital Signs:  Vitals:    08/07/24 0818   BP: (!) 138/90   Site: Left Lower Arm   Position: Sitting   Cuff Size: Medium Adult   Pulse: 85   Weight: (!) 137.8 kg (303 lb 12.8 oz)              Wt Readings from Last 3 Encounters:   08/07/24 (!) 137.8 kg (303 lb 12.8 oz)   04/18/24 132 kg (291 lb 0.1 oz)   03/07/24 125.6 kg (277 lb)        Physical Exam:   Gen: alert and NAD  HEENT: sclera clear, pupils equal and reactive, extra ocular muscles intact, oropharynx clear, mucus membranes moist, tympanic membranes clear bilaterally, no cervical lymphadenopathy noted, and neck supple  Neck: supple, no significant adenopathy  Chest: clear to auscultation, no wheezes, rales or rhonchi, symmetric air entry  Heart: regular rate and rhythm, no murmurs  ABD: abdomen is soft without significant tenderness, masses, organomegaly or guarding.  EXT:peripheral pulses normal, no pedal edema, no clubbing or cyanosis  NEURO: alert, oriented, normal speech, no focal findings or movement disorder noted  Skin: well hydrated, no lesions, surgical site examined  Wounds: b/l TKA scar, left knee looks flushed but not erythematous   Labs:   WBC   Date Value Ref Range Status   08/02/2024 11.7 (H) 3.5 - 10.9 K/uL Final   04/15/2024 9.0 3.5 - 10.9 K/uL Final   01/15/2024 6.7 10^3/mL Final     Creatinine   Date Value Ref Range Status   08/02/2024 0.9 0.5 - 1.2 MG/DL Final   04/15/2024 0.8 0.5 - 1.2 MG/DL Final   01/15/2024 0.8  Final       Cultures:  Culture   Date Value Ref Range Status   12/01/2023   Final    Final Report <50,000 CFU/ml mixed skin/urogenital mal. No further workup   08/07/2023 NO GROWTH AT 5 DAYS  Final   08/07/2023 NO GROWTH AT 5 DAYS  Final       Imaging Studies:

## 2024-08-07 NOTE — ASSESSMENT & PLAN NOTE
Labs from 8-24 reviewed.  WBC 11.7 sed rate 68, CRP 18.8 mg/L . No focal disease. Labs likely non-specific findings due to obesity, and possibly back issues. Continue cefadroxil.

## 2024-10-18 DIAGNOSIS — T84.53XD INFECTION OF TOTAL RIGHT KNEE REPLACEMENT, SUBSEQUENT ENCOUNTER: ICD-10-CM

## 2024-10-23 RX ORDER — CEFADROXIL 500 MG/1
500 CAPSULE ORAL 2 TIMES DAILY
Qty: 180 CAPSULE | Refills: 0 | OUTPATIENT
Start: 2024-10-23 | End: 2025-01-21

## 2024-11-05 LAB
A/G RATIO: 1.3 RATIO (ref 0.8–2.6)
ALBUMIN: 4.3 G/DL (ref 3.5–5.2)
ALP BLD-CCNC: 168 U/L (ref 23–144)
ALT SERPL-CCNC: 25 U/L (ref 0–60)
AST SERPL-CCNC: 26 U/L (ref 0–55)
BILIRUB SERPL-MCNC: 0.3 MG/DL (ref 0–1.2)
BILIRUBIN DIRECT: <0.2 MG/DL (ref 0–0.4)
BILIRUBIN, INDIRECT: ABNORMAL MG/DL (ref 0–1.2)
BUN / CREAT RATIO: 23 (ref 7–25)
BUN BLDV-MCNC: 18 MG/DL (ref 3–29)
C REACTIVE PROTEIN (CRP), BODY FLUID: 2.2 MG/DL
CALCIUM SERPL-MCNC: 9.6 MG/DL (ref 8.5–10.5)
CHLORIDE BLD-SCNC: 102 MEQ/L (ref 96–110)
CO2: 29 MEQ/L (ref 19–32)
CREAT SERPL-MCNC: 0.8 MG/DL (ref 0.5–1.2)
ESTIMATED GLOMERULAR FILTRATION RATE CREATININE EQUATION: 83 MLS/MIN/1.73M2
FASTING STATUS: ABNORMAL
GLOBULIN: 3.2 G/DL (ref 1.9–3.6)
GLUCOSE BLD-MCNC: 102 MG/DL (ref 70–99)
HCT VFR BLD CALC: 41.1 % (ref 34–49)
HEMOGLOBIN: 13.3 G/DL (ref 11.2–15.7)
MCH RBC QN AUTO: 27 PG (ref 26–34)
MCHC RBC AUTO-ENTMCNC: 32.4 G/DL (ref 30.7–35.5)
MCV RBC AUTO: 83.4 FL (ref 80–100)
PDW BLD-RTO: 13.8 %
PLATELET # BLD: 328 K/UL (ref 140–400)
PMV BLD AUTO: 11.1 FL (ref 7.2–11.7)
POTASSIUM SERPL-SCNC: 4.7 MEQ/L (ref 3.4–5.3)
RBC # BLD: 4.93 M/UL (ref 3.95–5.26)
SED RATE, AUTOMATED: 80 MM/HR (ref 0–30)
SODIUM BLD-SCNC: 141 MEQ/L (ref 135–148)
TOTAL PROTEIN: 7.5 G/DL (ref 6–8.3)
WBC # BLD: 11 K/UL (ref 3.5–10.9)

## 2024-11-12 ENCOUNTER — OFFICE VISIT (OUTPATIENT)
Dept: INFECTIOUS DISEASES | Age: 64
End: 2024-11-12
Payer: COMMERCIAL

## 2024-11-12 VITALS
HEART RATE: 90 BPM | SYSTOLIC BLOOD PRESSURE: 174 MMHG | DIASTOLIC BLOOD PRESSURE: 101 MMHG | WEIGHT: 293 LBS | BODY MASS INDEX: 46.25 KG/M2

## 2024-11-12 DIAGNOSIS — T84.53XD INFECTION OF TOTAL RIGHT KNEE REPLACEMENT, SUBSEQUENT ENCOUNTER: Primary | ICD-10-CM

## 2024-11-12 PROCEDURE — 99214 OFFICE O/P EST MOD 30 MIN: CPT | Performed by: INTERNAL MEDICINE

## 2024-11-12 RX ORDER — CEFADROXIL 500 MG/1
500 CAPSULE ORAL 2 TIMES DAILY
Qty: 180 CAPSULE | Refills: 0 | Status: SHIPPED | OUTPATIENT
Start: 2024-11-12 | End: 2025-02-10

## 2024-11-12 NOTE — ASSESSMENT & PLAN NOTE
Labs showed slight elevation in CRP, ESR and WBC. No clinical signs of infection and her knees are not bothering her. ESR and CRP are non-specific, perhaps due to her obesity and chronic DJD of the spine. 3 month prescription for cefadroxil was written. Labs in 3 months.

## 2024-11-12 NOTE — PROGRESS NOTES
Past Surgical History:   Procedure Laterality Date    CHOLECYSTECTOMY  1996    COLONOSCOPY  03/2022    Internal hemorrhoids    HIP SURGERY Left 2006    REVISION, DEBRIDEMENT    HYSTERECTOMY (CERVIX STATUS UNKNOWN)  2007    KNEE SURGERY Right 8/4/2023    KNEE INCISION AND DRAINAGE, POLY EXCHANGE performed by Seth Mar MD at [facility] OR    OVARY REMOVAL      TOTAL HIP ARTHROPLASTY Left 1993    TOTAL KNEE ARTHROPLASTY Right 7/10/2023    RIGHT KNEE TOTAL ARTHROPLASTY ROBOTIC performed by Seth Mar MD at [facility] OR    TOTAL KNEE ARTHROPLASTY Left 12/13/2023    LEFT KNEE TOTAL ARTHROPLASTY ROBOTIC performed by Seth Mar MD at [facility] OR    TUBAL LIGATION  1985    WISDOM TOOTH EXTRACTION         Social History     Socioeconomic History    Marital status:      Spouse name: Not on file    Number of children: Not on file    Years of education: Not on file    Highest education level: Not on file   Occupational History    Not on file   Tobacco Use    Smoking status: Never    Smokeless tobacco: Never   Vaping Use    Vaping status: Never Used   Substance and Sexual Activity    Alcohol use: Never    Drug use: Never    Sexual activity: Not on file   Other Topics Concern    Not on file   Social History Narrative    Not on file     Social Determinants of Health     Financial Resource Strain: Not on file   Food Insecurity: No Food Insecurity (12/13/2023)    Hunger Vital Sign     Worried About Running Out of Food in the Last Year: Never true     Ran Out of Food in the Last Year: Never true   Transportation Needs: No Transportation Needs (12/13/2023)    PRAPARE - Transportation     Lack of Transportation (Medical): No     Lack of Transportation (Non-Medical): No   Physical Activity: Not on file   Stress: Not on file   Social Connections: Not on file   Intimate Partner Violence: Not on file   Housing Stability: Low Risk  (12/13/2023)    Housing Stability Vital Sign     Unable to Pay for Housing in the Last Year: No

## 2025-02-24 NOTE — TELEPHONE ENCOUNTER
Derrell Meza, from [facility] called to state that she thinks she is having an allergic reaction to the Bactrim. Pt states that she feels itchy all over and wants to know if it would be ok to take benadryl, she has some at home. Please advise.
I performed the initial face to face bedside interview with this patient regarding history of present illness, review of symptoms and past medical, social and family history.  I completed an independent physical examination.  I was the initial provider who evaluated this patient.  The history, review of symptoms and examination was documented by the NP in my presence and I attest to the accuracy of the documentation.  I have discussed the patient’s plan of care and disposition with the NP.

## 2025-02-27 LAB
A/G RATIO: 1.2 RATIO (ref 0.8–2.6)
ALBUMIN: 4.1 G/DL (ref 3.5–5.2)
ALP BLD-CCNC: 152 U/L (ref 23–144)
ALT SERPL-CCNC: 19 U/L (ref 0–60)
AST SERPL-CCNC: 23 U/L (ref 0–55)
BILIRUB SERPL-MCNC: 0.3 MG/DL (ref 0–1.2)
BILIRUBIN DIRECT: <0.2 MG/DL (ref 0–0.4)
BILIRUBIN, INDIRECT: ABNORMAL MG/DL (ref 0–1.2)
BUN / CREAT RATIO: 19 (ref 7–25)
BUN BLDV-MCNC: 15 MG/DL (ref 3–29)
C REACTIVE PROTEIN (CRP), BODY FLUID: 2.72 MG/DL
CALCIUM SERPL-MCNC: 10.1 MG/DL (ref 8.5–10.5)
CHLORIDE BLD-SCNC: 101 MEQ/L (ref 96–110)
CO2: 31 MEQ/L (ref 19–32)
CREAT SERPL-MCNC: 0.8 MG/DL (ref 0.5–1.2)
ESTIMATED GLOMERULAR FILTRATION RATE CREATININE EQUATION: 82 MLS/MIN/1.73M2
FASTING STATUS: ABNORMAL
GLOBULIN: 3.4 G/DL (ref 1.9–3.6)
GLUCOSE BLD-MCNC: 100 MG/DL (ref 70–99)
HCT VFR BLD CALC: 42.9 % (ref 34–49)
HEMOGLOBIN: 13.9 G/DL (ref 11.2–15.7)
MCH RBC QN AUTO: 27.6 PG (ref 26–34)
MCHC RBC AUTO-ENTMCNC: 32.4 G/DL (ref 30.7–35.5)
MCV RBC AUTO: 85.3 FL (ref 80–100)
PDW BLD-RTO: 13.3 %
PLATELET # BLD: 306 K/UL (ref 140–400)
PMV BLD AUTO: 11.8 FL (ref 7.2–11.7)
POTASSIUM SERPL-SCNC: 4.7 MEQ/L (ref 3.4–5.3)
RBC # BLD: 5.03 M/UL (ref 3.95–5.26)
SED RATE, AUTOMATED: 56 MM/HR (ref 0–30)
SODIUM BLD-SCNC: 143 MEQ/L (ref 135–148)
TOTAL PROTEIN: 7.5 G/DL (ref 6–8.3)
WBC # BLD: 12.5 K/UL (ref 3.5–10.9)

## 2025-02-28 ENCOUNTER — TELEPHONE (OUTPATIENT)
Dept: INFECTIOUS DISEASES | Age: 65
End: 2025-02-28

## 2025-02-28 NOTE — TELEPHONE ENCOUNTER
Called to [facility] pharmacy [phone] cefadroxil 500 mg capsules, 1 by mouth 2 times daily, #180 no refills.

## 2025-03-04 ENCOUNTER — OFFICE VISIT (OUTPATIENT)
Dept: INFECTIOUS DISEASES | Age: 65
End: 2025-03-04
Payer: COMMERCIAL

## 2025-03-04 VITALS
HEART RATE: 87 BPM | BODY MASS INDEX: 43.12 KG/M2 | WEIGHT: 292 LBS | DIASTOLIC BLOOD PRESSURE: 93 MMHG | SYSTOLIC BLOOD PRESSURE: 150 MMHG

## 2025-03-04 DIAGNOSIS — T84.53XS INFECTION OF TOTAL RIGHT KNEE REPLACEMENT, SEQUELA: Primary | ICD-10-CM

## 2025-03-04 PROCEDURE — 99213 OFFICE O/P EST LOW 20 MIN: CPT | Performed by: INTERNAL MEDICINE

## 2025-03-04 NOTE — PROGRESS NOTES
sensation. Adherent to cefadroxil   8/7/2024: She is on chronic antibiotic suppression for right TKA MSSA PJI. Ran out of cefadroxil on 8/1/2024. She is having back issues: bulging discs in C4-C5 and spinal stenosis. She can't stand up straight and can't walk. She is largely sedentary. She is going to see Dr. Valerio and may get an intra-articular spine injection.  11/12/2024: She has back pain and is waiting to get back surgery. She denies nausea, vomiting, diarrhea.   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    She reports no new symptoms related to her right knee. She is currently on cefadroxil and is scheduled for back surgery on the 12th, with an anticipated hospital stay of two nights. She plans to discuss her current medication regimen with her surgeon during her upcoming appointment on Thursday.    She has been actively trying to lose weight through dietary modifications. She has no history of diabetes or fatty liver disease.    MEDICATIONS  cefadroxil          Review of Systems   All other systems reviewed and are negative.      Allergies   Allergen Reactions    Bactrim [Sulfamethoxazole-Trimethoprim] Itching     Has itching all over.       Patient Active Problem List   Diagnosis    Localized swelling of both lower legs    Varicose veins of bilateral lower extremities with other complications    Primary osteoarthritis of left knee    Primary osteoarthritis of right knee    Status post total left knee replacement    Septic joint (HCC)    Infection of total right knee replacement    Receiving intravenous antibiotic treatment as outpatient    Methicillin susceptible Staphylococcus aureus infection    Drug-induced pruritus    Lumbar back pain with radiculopathy affecting left lower extremity       Current Outpatient Medications   Medication Sig Dispense Refill    vitamin B-6 (PYRIDOXINE) 100 MG

## 2025-03-04 NOTE — ASSESSMENT & PLAN NOTE
CRP remains mildly elevated but within range of previous values.  Slight elevation in alkaline phosphatase.

## 2025-06-02 ENCOUNTER — TELEPHONE (OUTPATIENT)
Dept: INFECTIOUS DISEASES | Age: 65
End: 2025-06-02

## 2025-06-02 NOTE — TELEPHONE ENCOUNTER
Called to [facility] pharmacy [phone] cefadroxil 500 mg capsules, 1 by mouth 2 times daily, #180 no refills.

## 2025-06-02 NOTE — TELEPHONE ENCOUNTER
Pt will be out of her cefadroxil 500 mg capsules, 1 by mouth 2 times daily before she comes in for her appt.     Please advise.

## 2025-06-21 LAB
A/G RATIO: 1.2 RATIO (ref 0.8–2.6)
ALBUMIN: 4.1 G/DL (ref 3.5–5.2)
ALP BLD-CCNC: 209 U/L (ref 23–144)
ALT SERPL-CCNC: 14 U/L (ref 0–60)
AST SERPL-CCNC: 19 U/L (ref 0–55)
BILIRUB SERPL-MCNC: 0.2 MG/DL (ref 0–1.2)
BILIRUBIN DIRECT: 0.1 MG/DL (ref 0–0.4)
BILIRUBIN, INDIRECT: 0.1 MG/DL (ref 0–1.2)
BUN / CREAT RATIO: 33 (ref 7–25)
BUN BLDV-MCNC: 23 MG/DL (ref 3–29)
C REACTIVE PROTEIN (CRP), BODY FLUID: 2.19 MG/DL
CALCIUM SERPL-MCNC: 10.1 MG/DL (ref 8.5–10.5)
CHLORIDE BLD-SCNC: 101 MEQ/L (ref 96–110)
CO2: 26 MEQ/L (ref 19–32)
CREAT SERPL-MCNC: 0.7 MG/DL (ref 0.5–1.2)
ESTIMATED GLOMERULAR FILTRATION RATE CREATININE EQUATION: 97 MLS/MIN/1.73M2
FASTING STATUS: ABNORMAL
GLOBULIN: 3.4 G/DL (ref 1.9–3.6)
GLUCOSE BLD-MCNC: 105 MG/DL (ref 70–99)
HCT VFR BLD CALC: 43.6 % (ref 34–49)
HEMOGLOBIN: 13.6 G/DL (ref 11.2–15.7)
MCH RBC QN AUTO: 25.8 PG (ref 26–34)
MCHC RBC AUTO-ENTMCNC: 31.2 G/DL (ref 30.7–35.5)
MCV RBC AUTO: 82.7 FL (ref 80–100)
PDW BLD-RTO: 13.2 %
PLATELET # BLD: 412 K/UL (ref 140–400)
PMV BLD AUTO: 11.4 FL (ref 7.2–11.7)
POTASSIUM SERPL-SCNC: 5.4 MEQ/L (ref 3.4–5.3)
RBC # BLD: 5.27 M/UL (ref 3.95–5.26)
SED RATE, AUTOMATED: 98 MM/HR (ref 0–30)
SODIUM BLD-SCNC: 140 MEQ/L (ref 135–148)
TOTAL PROTEIN: 7.5 G/DL (ref 6–8.3)
WBC # BLD: 12.8 K/UL (ref 3.5–10.9)

## 2025-06-26 ENCOUNTER — OFFICE VISIT (OUTPATIENT)
Dept: INFECTIOUS DISEASES | Age: 65
End: 2025-06-26
Payer: COMMERCIAL

## 2025-06-26 VITALS
WEIGHT: 285.2 LBS | DIASTOLIC BLOOD PRESSURE: 90 MMHG | HEART RATE: 95 BPM | SYSTOLIC BLOOD PRESSURE: 150 MMHG | BODY MASS INDEX: 42.12 KG/M2

## 2025-06-26 DIAGNOSIS — R74.8 ABNORMAL SERUM LEVEL OF ALKALINE PHOSPHATASE: Primary | ICD-10-CM

## 2025-06-26 DIAGNOSIS — Z79.2 CHRONIC ANTIBIOTIC SUPPRESSION: ICD-10-CM

## 2025-06-26 PROCEDURE — 99214 OFFICE O/P EST MOD 30 MIN: CPT | Performed by: INTERNAL MEDICINE

## 2025-06-26 RX ORDER — CEFADROXIL 500 MG/1
500 CAPSULE ORAL 2 TIMES DAILY
Qty: 180 CAPSULE | Refills: 0 | Status: SHIPPED | OUTPATIENT
Start: 2025-06-26 | End: 2025-09-24

## 2025-06-26 NOTE — PROGRESS NOTES
6/26/2025         Referring Physician: No ref. provider found  Primary Care Physician: Marin Jones MD    Impression/Plan:   Diagnosis Orders   1. Abnormal serum level of alkaline phosphatase  Gamma GT    US ABDOMINAL LIMITED      2. Chronic antibiotic suppression  cefadroxil (DURICEF) 500 MG capsule    Basic Metabolic Panel    CBC    C-Reactive Protein    Hepatic Function Panel    Sedimentation Rate                Discussion:  Abnormal serum level of alkaline phosphatase  Chronically elevated ALP. No concurrent increase in other liver enzymes. Evaluate for hepatic steatosis.  Check GGT and liver US.      Chronic antibiotic suppression     Hx of right knee MSSA PJI, s/p I and D in 7/10/2023. Continue chronic abx suppression with cefadroxil.       Assessment & Plan            Return in about 3 months (around 9/26/2025).    29 minutes was spent in the encounter; more than 50% of the face-to-face time was spent with the patient providing counseling and coordination of care.      History: Donell Benjamin is a 64 y.o.  female presenting today for follow-up.  She was seen in the hospital during 8/4/2023 admission for pain, swelling, and redness of her right knee joint status post right total knee arthroplasty on 7/10/2023.  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  I&D and poly exchange was done on 8/4/2023.  Right knee culture and blood cultures were positive for MSSA.  She was discharged on 8/10/2023 to complete a 6-week course of intravenous cefazolin and oral rifampicin with a tentative end date of 9/17/2023.  And the plan to transition to oral antibiotics for at least 6 months.She is here with her : Maricruz Denies fatigue, fever, chills, night sweats and weight loss. Right knee incision has healed.  9/27/2023: Last visit Bactrim was prescribed.  However, she developed itching to it.  She has stopped the Bactrim.A day after starting Bactrim she felt light-headed and weak. Itching started on

## 2025-06-26 NOTE — ASSESSMENT & PLAN NOTE
Hx of right knee MSSA PJI, s/p I and D in 7/10/2023. Continue chronic abx suppression with cefadroxil.

## 2025-06-26 NOTE — ASSESSMENT & PLAN NOTE
Chronically elevated ALP. No concurrent increase in other liver enzymes. Evaluate for hepatic steatosis.  Check GGT and liver US.

## 2025-07-21 ENCOUNTER — HOSPITAL ENCOUNTER (OUTPATIENT)
Dept: ULTRASOUND IMAGING | Age: 65
Discharge: HOME | End: 2025-07-21
Payer: COMMERCIAL

## 2025-07-21 DIAGNOSIS — R74.8 ABNORMAL SERUM LEVEL OF ALKALINE PHOSPHATASE: ICD-10-CM

## 2025-07-21 PROCEDURE — 76705 ECHO EXAM OF ABDOMEN: CPT

## 2025-08-12 ENCOUNTER — OFFICE VISIT (OUTPATIENT)
Dept: ORTHOPEDIC SURGERY | Age: 65
End: 2025-08-12

## 2025-08-12 VITALS — RESPIRATION RATE: 14 BRPM | HEIGHT: 69 IN | OXYGEN SATURATION: 97 % | BODY MASS INDEX: 42.12 KG/M2 | HEART RATE: 97 BPM

## 2025-08-12 DIAGNOSIS — Z96.651 S/P TOTAL KNEE REPLACEMENT, RIGHT: ICD-10-CM

## 2025-08-12 DIAGNOSIS — Z96.652 STATUS POST TOTAL LEFT KNEE REPLACEMENT: ICD-10-CM

## 2025-08-12 DIAGNOSIS — M54.16 LUMBAR BACK PAIN WITH RADICULOPATHY AFFECTING LEFT LOWER EXTREMITY: ICD-10-CM

## 2025-08-12 DIAGNOSIS — M16.11 ARTHRITIS OF RIGHT HIP: Primary | ICD-10-CM

## 2025-08-12 RX ORDER — CALCIUM CARBONATE 300MG(750)
TABLET,CHEWABLE ORAL
COMMUNITY
Start: 2024-12-30

## 2025-08-12 RX ORDER — HYDROCHLOROTHIAZIDE 25 MG/1
TABLET ORAL
COMMUNITY
Start: 2025-06-03

## 2025-08-16 ASSESSMENT — ENCOUNTER SYMPTOMS
COLOR CHANGE: 0
SHORTNESS OF BREATH: 0
CHEST TIGHTNESS: 0
BACK PAIN: 1

## 2025-08-19 RX ORDER — CELECOXIB 100 MG/1
100 CAPSULE ORAL
COMMUNITY

## 2025-08-19 RX ORDER — VENLAFAXINE HYDROCHLORIDE 37.5 MG/1
37.5 CAPSULE, EXTENDED RELEASE ORAL DAILY
COMMUNITY

## 2025-08-20 SDOH — ECONOMIC STABILITY: FOOD INSECURITY: WITHIN THE PAST 12 MONTHS, THE FOOD YOU BOUGHT JUST DIDN'T LAST AND YOU DIDN'T HAVE MONEY TO GET MORE.: NEVER TRUE

## 2025-08-20 SDOH — ECONOMIC STABILITY: FOOD INSECURITY: WITHIN THE PAST 12 MONTHS, YOU WORRIED THAT YOUR FOOD WOULD RUN OUT BEFORE YOU GOT MONEY TO BUY MORE.: NEVER TRUE

## 2025-08-20 SDOH — ECONOMIC STABILITY: INCOME INSECURITY: IN THE LAST 12 MONTHS, WAS THERE A TIME WHEN YOU WERE NOT ABLE TO PAY THE MORTGAGE OR RENT ON TIME?: NO

## 2025-08-20 SDOH — ECONOMIC STABILITY: TRANSPORTATION INSECURITY
IN THE PAST 12 MONTHS, HAS LACK OF TRANSPORTATION KEPT YOU FROM MEETINGS, WORK, OR FROM GETTING THINGS NEEDED FOR DAILY LIVING?: NO

## 2025-08-20 SDOH — ECONOMIC STABILITY: TRANSPORTATION INSECURITY
IN THE PAST 12 MONTHS, HAS THE LACK OF TRANSPORTATION KEPT YOU FROM MEDICAL APPOINTMENTS OR FROM GETTING MEDICATIONS?: NO

## 2025-08-26 ENCOUNTER — HOSPITAL ENCOUNTER (OUTPATIENT)
Dept: WOMENS IMAGING | Age: 65
Discharge: HOME | End: 2025-08-26
Payer: COMMERCIAL

## 2025-08-26 VITALS — BODY MASS INDEX: 42.21 KG/M2 | WEIGHT: 285 LBS | HEIGHT: 69 IN

## 2025-08-26 DIAGNOSIS — Z12.31 ENCOUNTER FOR SCREENING MAMMOGRAM FOR BREAST CANCER: ICD-10-CM

## 2025-08-26 PROCEDURE — 77063 BREAST TOMOSYNTHESIS BI: CPT

## 2025-08-27 ENCOUNTER — OFFICE VISIT (OUTPATIENT)
Age: 65
End: 2025-08-27
Payer: COMMERCIAL

## 2025-08-27 VITALS
WEIGHT: 287 LBS | DIASTOLIC BLOOD PRESSURE: 84 MMHG | HEIGHT: 69 IN | BODY MASS INDEX: 42.51 KG/M2 | SYSTOLIC BLOOD PRESSURE: 136 MMHG

## 2025-08-27 DIAGNOSIS — Z12.31 ENCOUNTER FOR SCREENING MAMMOGRAM FOR MALIGNANT NEOPLASM OF BREAST: ICD-10-CM

## 2025-08-27 DIAGNOSIS — Z01.419 ENCOUNTER FOR ANNUAL ROUTINE GYNECOLOGICAL EXAMINATION: Primary | ICD-10-CM

## 2025-08-27 DIAGNOSIS — Z13.820 SCREENING FOR OSTEOPOROSIS: ICD-10-CM

## 2025-08-27 PROCEDURE — 99396 PREV VISIT EST AGE 40-64: CPT | Performed by: OBSTETRICS & GYNECOLOGY

## 2025-08-27 ASSESSMENT — PATIENT HEALTH QUESTIONNAIRE - PHQ9
SUM OF ALL RESPONSES TO PHQ QUESTIONS 1-9: 0
2. FEELING DOWN, DEPRESSED OR HOPELESS: NOT AT ALL
SUM OF ALL RESPONSES TO PHQ QUESTIONS 1-9: 0
1. LITTLE INTEREST OR PLEASURE IN DOING THINGS: NOT AT ALL

## 2025-08-27 ASSESSMENT — ENCOUNTER SYMPTOMS
ABDOMINAL PAIN: 0
SHORTNESS OF BREATH: 0